# Patient Record
Sex: FEMALE | Race: WHITE | Employment: OTHER
[De-identification: names, ages, dates, MRNs, and addresses within clinical notes are randomized per-mention and may not be internally consistent; named-entity substitution may affect disease eponyms.]

---

## 2017-08-26 ENCOUNTER — HEALTH MAINTENANCE LETTER (OUTPATIENT)
Age: 37
End: 2017-08-26

## 2018-08-27 ENCOUNTER — HEALTH MAINTENANCE LETTER (OUTPATIENT)
Age: 38
End: 2018-08-27

## 2019-05-06 ENCOUNTER — RESULT FOLLOW UP (OUTPATIENT)
Dept: FAMILY MEDICINE | Facility: CLINIC | Age: 39
End: 2019-05-06

## 2019-05-06 ENCOUNTER — OFFICE VISIT (OUTPATIENT)
Dept: FAMILY MEDICINE | Facility: CLINIC | Age: 39
End: 2019-05-06
Payer: COMMERCIAL

## 2019-05-06 VITALS
TEMPERATURE: 99 F | SYSTOLIC BLOOD PRESSURE: 120 MMHG | WEIGHT: 242 LBS | HEIGHT: 67 IN | HEART RATE: 88 BPM | BODY MASS INDEX: 37.98 KG/M2 | OXYGEN SATURATION: 99 % | DIASTOLIC BLOOD PRESSURE: 72 MMHG

## 2019-05-06 DIAGNOSIS — Z12.31 ENCOUNTER FOR SCREENING MAMMOGRAM FOR BREAST CANCER: ICD-10-CM

## 2019-05-06 DIAGNOSIS — F41.9 ANXIETY: ICD-10-CM

## 2019-05-06 DIAGNOSIS — R87.810 CERVICAL HIGH RISK HPV (HUMAN PAPILLOMAVIRUS) TEST POSITIVE: ICD-10-CM

## 2019-05-06 DIAGNOSIS — Z12.4 CERVICAL CANCER SCREENING: ICD-10-CM

## 2019-05-06 DIAGNOSIS — Z80.3 FAMILY HISTORY OF BREAST CANCER IN FIRST DEGREE RELATIVE: ICD-10-CM

## 2019-05-06 DIAGNOSIS — Z97.5 IUD (INTRAUTERINE DEVICE) IN PLACE: ICD-10-CM

## 2019-05-06 DIAGNOSIS — Z00.01 ENCOUNTER FOR ROUTINE ADULT MEDICAL EXAM WITH ABNORMAL FINDINGS: Primary | ICD-10-CM

## 2019-05-06 PROCEDURE — 99385 PREV VISIT NEW AGE 18-39: CPT | Performed by: PHYSICIAN ASSISTANT

## 2019-05-06 PROCEDURE — 87624 HPV HI-RISK TYP POOLED RSLT: CPT | Performed by: PHYSICIAN ASSISTANT

## 2019-05-06 PROCEDURE — G0145 SCR C/V CYTO,THINLAYER,RESCR: HCPCS | Performed by: PHYSICIAN ASSISTANT

## 2019-05-06 RX ORDER — SERTRALINE HYDROCHLORIDE 100 MG/1
100 TABLET, FILM COATED ORAL DAILY
Qty: 90 TABLET | Refills: 1 | Status: SHIPPED | OUTPATIENT
Start: 2019-05-06 | End: 2019-11-26

## 2019-05-06 RX ORDER — SERTRALINE HYDROCHLORIDE 100 MG/1
100 TABLET, FILM COATED ORAL
COMMUNITY
Start: 2018-05-24 | End: 2019-05-06

## 2019-05-06 ASSESSMENT — ANXIETY QUESTIONNAIRES
3. WORRYING TOO MUCH ABOUT DIFFERENT THINGS: NOT AT ALL
6. BECOMING EASILY ANNOYED OR IRRITABLE: NOT AT ALL
1. FEELING NERVOUS, ANXIOUS, OR ON EDGE: SEVERAL DAYS
IF YOU CHECKED OFF ANY PROBLEMS ON THIS QUESTIONNAIRE, HOW DIFFICULT HAVE THESE PROBLEMS MADE IT FOR YOU TO DO YOUR WORK, TAKE CARE OF THINGS AT HOME, OR GET ALONG WITH OTHER PEOPLE: NOT DIFFICULT AT ALL
GAD7 TOTAL SCORE: 2
2. NOT BEING ABLE TO STOP OR CONTROL WORRYING: NOT AT ALL
5. BEING SO RESTLESS THAT IT IS HARD TO SIT STILL: NOT AT ALL
7. FEELING AFRAID AS IF SOMETHING AWFUL MIGHT HAPPEN: SEVERAL DAYS

## 2019-05-06 ASSESSMENT — PATIENT HEALTH QUESTIONNAIRE - PHQ9
5. POOR APPETITE OR OVEREATING: NOT AT ALL
SUM OF ALL RESPONSES TO PHQ QUESTIONS 1-9: 1

## 2019-05-06 ASSESSMENT — MIFFLIN-ST. JEOR: SCORE: 1815.33

## 2019-05-06 NOTE — PROGRESS NOTES
"   SUBJECTIVE:   CC: Jacy Medrano is an 38 year old woman who presents for preventive health visit.       Healthy Habits:    Do you get at least three servings of calcium containing foods daily (dairy, green leafy vegetables, etc.)? yes    Amount of exercise or daily activities, outside of work: 2 day(s) per week    Problems taking medications regularly No    Medication side effects: No    Have you had an eye exam in the past two years? yes    Do you see a dentist twice per year? yes    Do you have sleep apnea, excessive snoring or daytime drowsiness?no    1) Refill zoloft. Worsening anxiety after 2nd child about 2 yrs ago. Admits she had probably some anxiety preceding this and it runs in her family, but after life changes it worsened. Since starting on it has done well and has no side effects with it.      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 Would like to update all family history to ensure she has the appropriate screening.  Maternal and paternal grandmother's both had breast cancer.   Sister was just found to have \"pre-cancerous cells, stage zero\" and elected to just have prophylactic mastectomy as a result of this.   Reports 2 of her sisters had genetic testing and was negative for BRCA. She is not sure what other genetic testing was done or if there were any positive findings. One sister qualifies to have mammograms and breast MRIs done.  Pt would like to have a baseline mammogram as has never had one.   Denies any breast complaints.      Today's PHQ-2 Score:   PHQ-2 ( 1999 Pfizer) 5/6/2019 10/24/2012   Q1: Little interest or pleasure in doing things 0 0   Q2: Feeling down, depressed or hopeless 0 0   PHQ-2 Score 0 0       Abuse: Current or Past(Physical, Sexual or Emotional)- No  Do you feel safe in your environment? Yes    Social History     Tobacco Use     Smoking status: Never Smoker     " "Smokeless tobacco: Never Used   Substance Use Topics     Alcohol use: Yes     Comment: 5/week     If you drink alcohol do you typically have >3 drinks per day or >7 drinks per week? No                     Reviewed orders with patient.  Reviewed health maintenance and updated orders accordingly - Yes  BP Readings from Last 3 Encounters:   05/06/19 120/72   10/24/12 111/72   08/09/12 126/78    Wt Readings from Last 3 Encounters:   05/06/19 109.8 kg (242 lb)   10/24/12 87.5 kg (193 lb)   08/09/12 87.5 kg (193 lb)                  Patient Active Problem List   Diagnosis     Abnormal glandular Papanicolaou smear of cervix     Hyperhidrosis     CARDIOVASCULAR SCREENING; LDL GOAL LESS THAN 160     BMI 30.0-30.9,adult     IUD (intrauterine device) in place - Mirena 3/20/2018, due for replacement 3/20/2023.     Family history of breast cancer in first degree relative - Mother age 51, sister age 40 \"stage zero\" had prophylactic mastectomy, maternal and paternal grandmothers.     Past Surgical History:   Procedure Laterality Date     HC TOOTH EXTRACTION W/FORCEP  1999       Social History     Tobacco Use     Smoking status: Never Smoker     Smokeless tobacco: Never Used   Substance Use Topics     Alcohol use: Yes     Comment: 5/week     Family History   Problem Relation Age of Onset     Breast Cancer Mother 51     Hypertension Father      Hyperlipidemia Father      Breast Cancer Sister         \"stage zero\" had prophylactic mastectomy     Diabetes Maternal Grandmother      Breast Cancer Maternal Grandmother         age 50's     Diabetes Maternal Grandfather         adult onset     Diabetes Paternal Grandmother      Breast Cancer Paternal Grandmother      Breast Cancer Paternal Aunt      C.A.D. No family hx of      Alzheimer Disease No family hx of      Blood Disease No family hx of      Eye Disorder No family hx of      Osteoporosis No family hx of      Thyroid Disease No family hx of      Coronary Artery Disease No family hx " "of      Cerebrovascular Disease No family hx of      Colon Cancer No family hx of          Current Outpatient Medications   Medication Sig Dispense Refill     levonorgestrel (MIRENA) 20 MCG/24HR IUD 1 each (20 mcg) by Intrauterine route once for 1 dose Placed outside [place] 3/20/2018 1 each 0     sertraline (ZOLOFT) 100 MG tablet Take 1 tablet (100 mg) by mouth daily 90 tablet 1       Mammogram not yet required, see FHx above though in HPI.    Pertinent mammograms are reviewed under the imaging tab.  History of abnormal Pap smear: NO - age 30- 65 PAP every 3 years recommended  Reviewed [facility] through [facility] and [facility]where  2005 LSIL  2006 LSIL  2009, 2010, 2011, 2012, 2015 and 2018 NIL    PAP / HPV 4/10/2012 1/10/2011 4/5/2010   PAP NIL NIL NIL       Reviewed and updated as needed this visit by clinical staff  Tobacco  Allergies  Meds  Problems  Med Hx  Surg Hx  Fam Hx  Soc Hx          Reviewed and updated as needed this visit by Provider  Tobacco  Allergies  Meds  Problems  Med Hx  Surg Hx  Fam Hx  Soc Hx             ROS:  CONSTITUTIONAL: NEGATIVE for fever, chills, change in weight  INTEGUMENTARU/SKIN: NEGATIVE for worrisome rashes, moles or lesions  EYES: NEGATIVE for vision changes or irritation  ENT: NEGATIVE for ear, mouth and throat problems  RESP: NEGATIVE for significant cough or SOB  BREAST: NEGATIVE for masses, tenderness or discharge  CV: NEGATIVE for chest pain, palpitations or peripheral edema  GI: NEGATIVE for nausea, abdominal pain, heartburn, or change in bowel habits  : NEGATIVE for unusual urinary or vaginal symptoms. Amenorrheic on Mirena.  MUSCULOSKELETAL: NEGATIVE for significant arthralgias or myalgia  NEURO: NEGATIVE for weakness, dizziness or paresthesias  PSYCHIATRIC: NEGATIVE for changes in mood or affect    OBJECTIVE:   /72 (BP Location: Right arm, Cuff Size: Adult Regular)   Pulse 88   Temp 99  F (37.2  C) (Oral)   Ht 1.702 m (5' 7\")   Wt 109.8 kg (242 lb) " "  SpO2 99%   BMI 37.90 kg/m    EXAM:  GENERAL: healthy, alert and no distress  EYES: Eyes grossly normal to inspection, PERRL and conjunctivae and sclerae normal  HENT: ear canals and TM's normal, nose and mouth without ulcers or lesions  NECK: no adenopathy, no asymmetry, masses, or scars and thyroid normal to palpation  RESP: lungs clear to auscultation - no rales, rhonchi or wheezes  BREAST: normal without masses, tenderness or nipple discharge and no palpable axillary masses or adenopathy  CV: regular rate and rhythm, normal S1 S2, no S3 or S4, no murmur, click or rub, no peripheral edema and peripheral pulses strong  ABDOMEN: soft, nontender, no hepatosplenomegaly, no masses and bowel sounds normal   (female): normal female external genitalia, normal urethral meatus, vaginal mucosa pink, moist, well rugated, and normal cervix/adnexa/uterus without masses or discharge. Mirena strings are noted exiting cervical os.  MS: no gross musculoskeletal defects noted, no edema  SKIN: no suspicious lesions or rashes  NEURO: Normal strength and tone, mentation intact and speech normal  PSYCH: mentation appears normal, affect normal/bright    Diagnostic Test Results:  See flowsheets for PHQ/MAHOGANY scores.    ASSESSMENT/PLAN:       ICD-10-CM    1. Encounter for routine adult medical exam with abnormal findings Z00.01    2. Anxiety F41.9 sertraline (ZOLOFT) 100 MG tablet   3. IUD (intrauterine device) in place - Mirena 3/20/2018, due for replacement 3/20/2023. Z97.5    4. Family history of breast cancer in first degree relative - Mother age 51, sister age 40 \"stage zero\" had prophylactic mastectomy, maternal and paternal grandmothers. Z80.3 CANCER RISK MGMT/CANCER GENETIC COUNSELING REFERRAL     MA Screen Bilateral w/Nicholas   5. Encounter for screening mammogram for breast cancer Z12.31 MA Screen Bilateral w/Nicholas   6. Cervical cancer screening Z12.4 Pap imaged thin layer screen with HPV - recommended age 30 - 65 years (select " "HPV order below)     HPV High Risk Types DNA Cervical   Not fasting - pt would like to defer labs until next year.  Repeat pap today to re-establish in our health system, but Epic review shows she would be able to maintain every 3 yrs screening after history was reviewed.  We also discussed significant FHx of multiple first and second degree relatives with breast cancer. Pt requested to have a baseline mammogram so this was ordered for her today. She is aware that insurance may not cover it and she is ok with this. Will also refer to cancer risk management group at the [facility] to see if any additional testing is needed versus if patient qualifies for any additional high risk screening.   Hx of anxiety - very stable on zoloft. She will continue without changes. Ok to do next visit via phone since very stable in 6 months.     COUNSELING:   Reviewed preventive health counseling, as reflected in patient instructions       Regular exercise       Healthy diet/nutrition       Contraception    BP Readings from Last 1 Encounters:   05/06/19 120/72     Estimated body mass index is 37.9 kg/m  as calculated from the following:    Height as of this encounter: 1.702 m (5' 7\").    Weight as of this encounter: 109.8 kg (242 lb).      Weight management plan: Discussed healthy diet and exercise guidelines     reports that she has never smoked. She has never used smokeless tobacco.      Counseling Resources:  ATP IV Guidelines  Pooled Cohorts Equation Calculator  Breast Cancer Risk Calculator  FRAX Risk Assessment  ICSI Preventive Guidelines  Dietary Guidelines for Americans, 2010  USDA's MyPlate  ASA Prophylaxis  Lung CA Screening    Greta Garcia PA-C  [facility]  "

## 2019-05-07 ASSESSMENT — ANXIETY QUESTIONNAIRES: GAD7 TOTAL SCORE: 2

## 2019-05-08 NOTE — TELEPHONE ENCOUNTER
ONCOLOGY INTAKE: Records Information      APPT INFORMATION:  Referring provider:  Greta Garcia  Referring provider s clinic: [facility]  Reason for visit/diagnosis:  Family History of breast cancer  RECORDS INFORMATION:  Were the records received with the referral (via [employer])? no    Has patient been seen for any external appt for this diagnosis? no    If yes, where? n/a    Has patient had any imaging or procedures outside of [place] for this condition? no      If Yes, where? n/a    ADDITIONAL INFORMATION:  None

## 2019-05-09 LAB
COPATH REPORT: NORMAL
PAP: NORMAL

## 2019-05-13 LAB
FINAL DIAGNOSIS: ABNORMAL
HPV HR 12 DNA CVX QL NAA+PROBE: POSITIVE
HPV16 DNA SPEC QL NAA+PROBE: NEGATIVE
HPV18 DNA SPEC QL NAA+PROBE: NEGATIVE
SPECIMEN DESCRIPTION: ABNORMAL
SPECIMEN SOURCE CVX/VAG CYTO: ABNORMAL

## 2019-05-14 NOTE — PROGRESS NOTES
08/04/05: LSIL Pap  08/15/06: LSIL Pap  02/04/09: NIL Pap  05/04/10: NIL Pap  01/10/11: NIL Pap  04/10/12: NIL Pap  03/20/18: NIL Pap (care everywhere)  05/06/19: NIL Pap, + HR HPV (not 16 or 18) result. Plan cotest in 1 year.   05/14/19:Msg left to call back. Pt was advised.

## 2019-05-15 ENCOUNTER — HOSPITAL ENCOUNTER (OUTPATIENT)
Dept: MAMMOGRAPHY | Facility: CLINIC | Age: 39
Discharge: HOME | End: 2019-05-15
Attending: PHYSICIAN ASSISTANT | Admitting: PHYSICIAN ASSISTANT
Payer: COMMERCIAL

## 2019-05-15 DIAGNOSIS — Z80.3 FAMILY HISTORY OF BREAST CANCER IN FIRST DEGREE RELATIVE: ICD-10-CM

## 2019-05-15 DIAGNOSIS — Z12.31 ENCOUNTER FOR SCREENING MAMMOGRAM FOR BREAST CANCER: ICD-10-CM

## 2019-05-15 PROCEDURE — 77063 BREAST TOMOSYNTHESIS BI: CPT

## 2019-06-26 ENCOUNTER — PRE VISIT (OUTPATIENT)
Dept: ONCOLOGY | Facility: CLINIC | Age: 39
End: 2019-06-26

## 2019-06-26 ENCOUNTER — OFFICE VISIT (OUTPATIENT)
Dept: FAMILY MEDICINE | Facility: CLINIC | Age: 39
End: 2019-06-26
Payer: COMMERCIAL

## 2019-06-26 VITALS — DIASTOLIC BLOOD PRESSURE: 78 MMHG | SYSTOLIC BLOOD PRESSURE: 122 MMHG

## 2019-06-26 DIAGNOSIS — Z12.83 SKIN CANCER SCREENING: Primary | ICD-10-CM

## 2019-06-26 DIAGNOSIS — D22.9 MULTIPLE BENIGN MELANOCYTIC NEVI: ICD-10-CM

## 2019-06-26 DIAGNOSIS — L81.4 SOLAR LENTIGO: ICD-10-CM

## 2019-06-26 DIAGNOSIS — D18.01 CAPILLARY HEMANGIOMA OF SKIN: ICD-10-CM

## 2019-06-26 DIAGNOSIS — D22.72: ICD-10-CM

## 2019-06-26 DIAGNOSIS — Z80.8 FAMILY HISTORY OF SKIN CANCER: ICD-10-CM

## 2019-06-26 PROCEDURE — 99214 OFFICE O/P EST MOD 30 MIN: CPT | Performed by: FAMILY MEDICINE

## 2019-06-26 NOTE — PROGRESS NOTES
[facility] - PRIMARY CARE SKIN    CC: skin cancer screening (full-body)  SUBJECTIVE:   Jacy Zhang is a(n) 38 year old female who presents to clinic today for a full-body skin exam.    No bothersome lesions noticed by the patient or other skin concerns.  A mole on the right cheek has been present since a young age.  A spot on the left thigh may be enlarging.    Jacy requests advice for facial products.    Personal Medical History  Skin cancer: NO  Eczema Psoriasis Autoimmune   NO NO NO     Family Medical History  Skin cancer: YES - in father on forehead (unsure of type)  Eczema Psoriasis Autoimmune   NO NO NO     Sun Exposure History  Previous history of significant sun exposure:  Blistering sunburns: NO.  Tanning beds: NO.  Sunscreen usage: YES, SPF: 30.  UV-protective clothing usage: NO.  Wide-brimmed hat usage: NO.  UV-protective sunglasses usage: YES.  Mid-day sun avoidance: NO.    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    Refer to electronic medical record (EMR) for past medical history and medications.    INTEGUMENTARY/SKIN: NEGATIVE for worrisome rashes, moles or lesions  ROS: 14 point review of systems was negative except the symptoms listed above in the HPI.    This document serves as a record of the services and decisions personally performed and made by Kelly Ochoa MD and was created by Deuce Knight, a trained medical scribe, based on personal observations and provider statements to the medical scribe.  June 26, 2019 2:52 PM   Deuce Knight    OBJECTIVE:   GENERAL: healthy, alert and no distress.  SKIN: Mcmanus Skin Type - II.  This patient was examined from the top of the head to the bottom of the feet  including scalp, face, neck, trunk, buttocks, both arms, both legs, both hands, both feet, and all fingers and toes. The dermatoscope was used to help evaluate pigmented lesions.  Skin Pertinent Findings:  Face: Scattered brown, macule(s) most consistent with benign solar  "lentigo.    Upper extremities: Scattered brown macules of various sizes and shapes most consistent with (benign) melanocytic nevi.    Left lateral hip: 10 mm in size stuck-on appearing papules, raised, brown, coarse-textured, round lesion(s) most consistent with seborrheic keratoses.    Left lateral patella: 25 mm in length x 10 mm nevus spilus.    Left distal anterior thigh: slightly raised, red lesion(s) consistent with capillary hemangioma.    Back: scattered infrequent brown macules of various sizes and shapes most consistent with (benign) melanocytic nevi.    ASSESSMENT:     Encounter Diagnoses   Name Primary?     Skin cancer screening Yes     Family history of skin cancer      Solar lentigo      Multiple benign melanocytic nevi      Nevus spilus of left lower leg      Capillary hemangioma of skin        PLAN:   Patient Instructions   FUTURE APPOINTMENTS    Follow up in 2-3 years for a full-body skin cancer screening.    Consider scheduling to see Diogenes Patrick, clinic , for consultation of skin products.    Make sure to use a facial cleanser, facial moisturizer and sunscreen regularly. You may also consider use of a topical retinol and a Vitamin C serum.    SUN PROTECTION INSTRUCTIONS  Sun damage can lead to skin cancer and premature aging of the skin.      The best way to protect from sun damage to your skin is to avoid the sun during peak hours (10 am - 2 pm) even on overcast days.    Never use tanning beds. Tanning beds are associated with much higher risks of skin cancer.    All tanning damages the skin. Aim for ivory skin year round and you will have less trouble with your skin in years to come. There is no merit in getting \"a base tan\" before a warm weather vacation, as any tanning indicates your body's response to sun damage.    Stop smoking. Smokers have higher rates of skin cancer and also have premature skin wrinkling.    Use UPF sun-protective clothing, which while more expensive " "initially provides longer lasting coverage without having to worry about remembering to re-apply.  1. Wear a wide-brimmed hat and sunglasses.   2. Wear sun-protective clothing.  [employer] and other [employer] make sun protective clothing that are stylish, comfortable and cool.   [employer] and other [employer] make UV arm sleeves suitable for golfing, gardening and other activities.    Sunscreen instructions:  1. Use sunscreens with Zinc Oxide, Titanium Dioxide or Avobenzone to protect from UVA rays.  2. Use SPF 30-50+ to protect from UVB rays.  3. Re-apply every 2 hours even if water resistant.  4. Apply on your face every day even when cloudy and even in the winter. UVA \"aging rays\" penetrate window glass and are just as strong in the winter as in the summer.    FYI  You should use about 3 tablespoons of sunscreen to protect your whole body. Thus a typical eight ounce bottle of sunscreen should last 4 applications. Remember, that the SPF rating is compromised if you don t apply enough. Most people only apply 1/2 - 1/3 of the amount they need. Also don t forget areas such as your ears, feet, upper back and harder to reach places. Keep in mind that these amounts should be increased for larger body sizes.    Sunscreens with titanium dioxide and/or zinc oxide in the active ingredients are physical blockers as opposed to chemical blockers. Chemical-free sunscreens should not irritate the skin.    Spray-on sunscreens may be used for touch-up application only, not as a base layer. Also, use with caution around small children due to inhalation risk.    SPF means sun protection factor, which is just the degree to which the sunscreen can protect against UVB rays. There is no rating system for UVA rays. SPF is calculated as the time skin will burn when sunscreen is applied vs. skin without sunscreen.    Water resistant sunscreens should be re-applied every 1-2 hours.    Product Recommendations:    Consider use of " sunscreen sticks with Zinc Oxide and Titanium Dioxide active ingredients such as Neutrogena Pure&Free Baby Sunscreen Stick.    Good examples include: Blue Lizard, EltaMD, Solbar    Good daily moisturizers with SPF: Vanicream, CeraVe.    For sensitive skin, consider : SkinMedica Essential Defense Mineral Shield Broad Spectrum SPF 35    Men: consider use of Neutrogena Triple Protect Facial Lotion    Avoid retinyl palmitate products.  Avoid combination products that include both sunscreen and insect repellant, as sunscreen should be applied every 2 hours, but insect repellant should not be applied as frequently.    For more information:  https://www.skincancer.org/prevention/sun-protection/sunscreen/sunscreens-safe-and-effective      The patient was counseled about sunscreens and sun avoidance. The patient was counseled to check the skin regularly and report any lesion that is new, changing, itching, scabbing, bleeding or otherwise bothersome. The patient was discharged ambulatory and in stable condition.    TT: 25 minutes.  CT: 15 minutes.    The information in this document, created by the medical scribe for me, accurately reflects the services I personally performed and the decisions made by me. I have reviewed and approved this document for accuracy prior to leaving the patient care area.  June 26, 2019 2:52 PM  Kelly Ochoa MD  [facility] [place]

## 2019-06-26 NOTE — PATIENT INSTRUCTIONS
"FUTURE APPOINTMENTS    Follow up in 2-3 years for a full-body skin cancer screening.    Consider scheduling to see Diogenes Patrick, clinic , for consultation of skin products.    Make sure to use a facial cleanser, facial moisturizer and sunscreen regularly. You may also consider use of a topical retinol and a Vitamin C serum.    SUN PROTECTION INSTRUCTIONS  Sun damage can lead to skin cancer and premature aging of the skin.      The best way to protect from sun damage to your skin is to avoid the sun during peak hours (10 am - 2 pm) even on overcast days.    Never use tanning beds. Tanning beds are associated with much higher risks of skin cancer.    All tanning damages the skin. Aim for ivory skin year round and you will have less trouble with your skin in years to come. There is no merit in getting \"a base tan\" before a warm weather vacation, as any tanning indicates your body's response to sun damage.    Stop smoking. Smokers have higher rates of skin cancer and also have premature skin wrinkling.    Use UPF sun-protective clothing, which while more expensive initially provides longer lasting coverage without having to worry about remembering to re-apply.  1. Wear a wide-brimmed hat and sunglasses.   2. Wear sun-protective clothing.  [employer] and other [employer] make sun protective clothing that are stylish, comfortable and cool.   [employer] and other [employer] make UV arm sleeves suitable for golfing, gardening and other activities.    Sunscreen instructions:  1. Use sunscreens with Zinc Oxide, Titanium Dioxide or Avobenzone to protect from UVA rays.  2. Use SPF 30-50+ to protect from UVB rays.  3. Re-apply every 2 hours even if water resistant.  4. Apply on your face every day even when cloudy and even in the winter. UVA \"aging rays\" penetrate window glass and are just as strong in the winter as in the summer.    FYI  You should use about 3 tablespoons of sunscreen to protect your " whole body. Thus a typical eight ounce bottle of sunscreen should last 4 applications. Remember, that the SPF rating is compromised if you don t apply enough. Most people only apply 1/2 - 1/3 of the amount they need. Also don t forget areas such as your ears, feet, upper back and harder to reach places. Keep in mind that these amounts should be increased for larger body sizes.    Sunscreens with titanium dioxide and/or zinc oxide in the active ingredients are physical blockers as opposed to chemical blockers. Chemical-free sunscreens should not irritate the skin.    Spray-on sunscreens may be used for touch-up application only, not as a base layer. Also, use with caution around small children due to inhalation risk.    SPF means sun protection factor, which is just the degree to which the sunscreen can protect against UVB rays. There is no rating system for UVA rays. SPF is calculated as the time skin will burn when sunscreen is applied vs. skin without sunscreen.    Water resistant sunscreens should be re-applied every 1-2 hours.    Product Recommendations:    Consider use of sunscreen sticks with Zinc Oxide and Titanium Dioxide active ingredients such as Neutrogena Pure&Free Baby Sunscreen Stick.    Good examples include: Blue Lizard, EltaMD, Solbar    Good daily moisturizers with SPF: Vanicream, CeraVe.    For sensitive skin, consider : SkinMedica Essential Defense Mineral Shield Broad Spectrum SPF 35    Men: consider use of Neutrogena Triple Protect Facial Lotion    Avoid retinyl palmitate products.  Avoid combination products that include both sunscreen and insect repellant, as sunscreen should be applied every 2 hours, but insect repellant should not be applied as frequently.    For more information:  https://www.skincancer.org/prevention/sun-protection/sunscreen/sunscreens-safe-and-effective

## 2019-08-25 NOTE — PROGRESS NOTES
8/26/2019    Referring Provider: Greta Garcia PA-C    Presenting Information:   I met with Jacy Zhang today for genetic counseling at the Cancer Risk Management Program at the [facility] to discuss her family history of breast, colon, kidney, bladder, and prostate cancer. She is here today to review this history, cancer screening recommendations, and available genetic testing options.    Personal History:  Jacy is a 38 year old female. She does not have any personal history of cancer.      She had her first menstrual period at age 13, her first child at age 34, and is premenopausal.  Jacy has her ovaries, fallopian tubes and uterus in place, and she has not had ovarian cancer screening to date. She reports that she has not had hormone replacement therapy.      She has had clinical breast exams and mammograms based on her family history; her most recent mammogram in 5- was normal. Jacy has not had a colonoscopy. She does not regularly do any other cancer screening at this time. Jacy reported no tobacco use and social alcohol use.    Family History: (Please see scanned pedigree for detailed family history information)    Jacy's sister was diagnosed with DCIS at 40 and is currently 41. By report, she had negative BRCA1/2 testing. Jacy was not sure if there were other genes included in her sister's genetic testing and plans to follow-up with me soon regarding this test.    Her two older sisters do not have a history of cancer, but underwent genetic testing and were reportedly negative.     Her mother had breast cancer at 52, 57, and 62; she is currently 67. By report, she had negative genetic testing through [facility] for 42 genes.     Her maternal aunt had breast cancer at 60 and is currently 61.     Her maternal grandmother had breast cancer at 82 and passed away at 90.     Her maternal grandfather had bladder cancer at 63 and kidney cancer at 76. He passed away at 76.  His sister had colon cancer at 76 and passed at 76.    By report, Jacy's father had negative genetic testing in 2015.     Her paternal grandmother had breast cancer at 52 and passed soon after. Her sister (Jacy's great aunt) had breast cancer at an unknown age. This great aunt had a daughter who also had breast cancer at an unknown age.    Her paternal grandfather had prostate cancer at 81 and passed at 83. He had three sisters with breast cancer with age of onset unknown.    Her maternal ethnicity is [place]. Her paternal ethnicity is [place] and [place].  There is no known Ashkenazi Pentecostal ancestry on either side of her family. There is no reported consanguinity.    Discussion:    Jacy's family history of breast cancer is suggestive of a hereditary cancer syndrome.     We reviewed the features of sporadic, familial, and hereditary cancers. In looking at Jacy's family history, it is possible that a cancer susceptibility gene is present due to the presence of breast cancer on both the maternal and paternal sides of the family. We discussed that her maternal grandfather's bladder and kidney cancer, his brother's colon cancer, and her paternal grandfather's prostate cancer are less likely to be caused by a hereditary cancer syndrome given the older age of onset.   We discussed the natural history and genetics of Hereditary Breast and Ovarian Cancer (HBOC), which is caused by a mutation in the BRCA1 or BRCA2 gene.  HBOC typically presents with multiple family members diagnosed with breast cancer before age 50 and/or ovarian cancer. Other cancer risks associated with HBOC include male breast cancer, prostate cancer, pancreatic cancer, and melanoma.     We discussed that genetic testing for breast cancer susceptibility genes is typically most informative when it is first performed on a family member with a personal history of  breast cancer. In this situation, we discussed that Jacy's mother, father, and sister  with DCIS all had negative genetic testing for BRCA1 and BRCA2. We reviewed the autosomal dominant inheritance of mutations in BRCA1 and BRCA2 and discussed that her parents did not pass on an identifiable mutation in these genes to their children based on this test result. Mutations in these genes do not skip generations.      We discussed that if her sister only had testing for BRCA1 and BRCA2 only, there are additional genes that could cause increased risk for breast cancer. As many of these genes present with overlapping features in a family and accurate cancer risk cannot always be established based upon the pedigree analysis alone, it would be reasonable for Jacy to consider panel genetic testing in the future to analyze multiple genes at once.    Testing is available to Jacy, but with limitations. If Jacy pursues testing at this time and receives a negative result, this does not rule out the possibility of a hereditary cancer syndrome in her and/or her family. Jacy opted not to have genetic testing today, but expressed that she will consider it for the future.    Based on her personal and family history, Jacy has a 38.3% lifetime risk of developing breast cancer based on the ENEDINA model. As such, Jacy meets current National Comprehensive Cancer Network (NCCN) guidelines for high risk breast screening. This includes annual breast MRI in addition to annual mammogram beginning at age 30.  In addition, Jacy should be receiving clinical breast exams by her physician. We discussed that Jacy could participate in our Cancer Risk Management Program in which our clinical nurse specialist provides an individual screening plan and assists with medical management. A referral was made to see ISHA Stafford, for this service.    Medical Management: For Jacy, we reviewed that the information from future genetic testing may determine:    additional cancer screening for which Jacy may qualify (i.e.  mammogram and breast MRI, more frequent colonoscopies, more frequent dermatologic exams, etc.),    options for risk reducing surgeries Jacy could consider (i.e. bilateral mastectomy, surgery to remove her ovaries and/or uterus, etc.),      These recommendations will be discussed in detail if she decides to undergo genetic testing in the future.     Plan:  1) Today Jacy elected not to proceed with genetic testing. She will contact me if she decides to test in the future.  2) She plans to contact me with information about her sister's genetic testing.  3) A referral was placed to see our clinical nurse specialist, ISHA Stafford, CNS.    Face to face time: 40 minutes    Kerry Lynn MS  Genetic counselor  [phone]      Attestation: Patient seen, evaluated and discussed with the Genetic Counseling Intern. I have verified the content of the note, which accurately reflects my assessment of the patient and the plan of care.    Supervising Genetic Counselor  Re Kerr MS, [facility]  Licensed Genetic Counselor  [phone]

## 2019-08-26 ENCOUNTER — ONCOLOGY VISIT (OUTPATIENT)
Dept: ONCOLOGY | Facility: CLINIC | Age: 39
End: 2019-08-26
Attending: GENETIC COUNSELOR, MS
Payer: COMMERCIAL

## 2019-08-26 ENCOUNTER — PRE VISIT (OUTPATIENT)
Dept: ONCOLOGY | Facility: CLINIC | Age: 39
End: 2019-08-26

## 2019-08-26 DIAGNOSIS — Z80.3 FAMILY HISTORY OF BREAST CANCER IN FIRST DEGREE RELATIVE: Primary | ICD-10-CM

## 2019-08-26 PROCEDURE — 96040 ZZH GENETIC COUNSELING, EACH 30 MINUTES: CPT | Performed by: GENETIC COUNSELOR, MS

## 2019-08-26 NOTE — LETTER
8/26/2019         RE: Jacy Zhang  [address] [place] [address]        Dear Colleague,    Thank you for referring your patient, Jacy Zhang, to the CANCER RISK MANAGEMENT PROGRAM. Please see a copy of my visit note below.    8/26/2019    Referring Provider: Greta Garcia PA-C    Presenting Information:   I met with Jacy Zhang today for genetic counseling at the Cancer Risk Management Program at the [facility] to discuss her family history of breast, colon, kidney, bladder, and prostate cancer. She is here today to review this history, cancer screening recommendations, and available genetic testing options.    Personal History:  Jacy is a 38 year old female. She does not have any personal history of cancer.      She had her first menstrual period at age 13, her first child at age 34, and is premenopausal.  Jacy has her ovaries, fallopian tubes and uterus in place, and she has not had ovarian cancer screening to date. She reports that she has not had hormone replacement therapy.      She has had clinical breast exams and mammograms based on her family history; her most recent mammogram in 5- was normal. Jacy has not had a colonoscopy. She does not regularly do any other cancer screening at this time. Jacy reported no tobacco use and social alcohol use.    Family History: (Please see scanned pedigree for detailed family history information)    Jacy's sister was diagnosed with DCIS at 40 and is currently 41. By report, she had negative BRCA1/2 testing. Jacy was not sure if there were other genes included in her sister's genetic testing and plans to follow-up with me soon regarding this test.    Her two older sisters do not have a history of cancer, but underwent genetic testing and were reportedly negative.     Her mother had breast cancer at 52, 57, and 62; she is currently 67. By report, she had negative genetic testing through [facility] for 42 genes.      Her maternal aunt had breast cancer at 60 and is currently 61.     Her maternal grandmother had breast cancer at 82 and passed away at 90.     Her maternal grandfather had bladder cancer at 63 and kidney cancer at 76. He passed away at 76. His sister had colon cancer at 76 and passed at 76.    By report, Jacy's father had negative genetic testing in 2015.     Her paternal grandmother had breast cancer at 52 and passed soon after. Her sister (Jacy's great aunt) had breast cancer at an unknown age. This great aunt had a daughter who also had breast cancer at an unknown age.    Her paternal grandfather had prostate cancer at 81 and passed at 83. He had three sisters with breast cancer with age of onset unknown.    Her maternal ethnicity is [place]i. Her paternal ethnicity is [place] and [place].  There is no known Ashkenazi Bahai ancestry on either side of her family. There is no reported consanguinity.    Discussion:    Jacy's family history of breast cancer is suggestive of a hereditary cancer syndrome.     We reviewed the features of sporadic, familial, and hereditary cancers. In looking at Jacy's family history, it is possible that a cancer susceptibility gene is present due to the presence of breast cancer on both the maternal and paternal sides of the family. We discussed that her maternal grandfather's bladder and kidney cancer, his brother's colon cancer, and her paternal grandfather's prostate cancer are less likely to be caused by a hereditary cancer syndrome given the older age of onset.   We discussed the natural history and genetics of Hereditary Breast and Ovarian Cancer (HBOC), which is caused by a mutation in the BRCA1 or BRCA2 gene.  HBOC typically presents with multiple family members diagnosed with breast cancer before age 50 and/or ovarian cancer. Other cancer risks associated with HBOC include male breast cancer, prostate cancer, pancreatic cancer, and melanoma.     We discussed that  genetic testing for breast cancer susceptibility genes is typically most informative when it is first performed on a family member with a personal history of  breast cancer. In this situation, we discussed that Jacy's mother, father, and sister with DCIS all had negative genetic testing for BRCA1 and BRCA2. We reviewed the autosomal dominant inheritance of mutations in BRCA1 and BRCA2 and discussed that her parents did not pass on an identifiable mutation in these genes to their children based on this test result. Mutations in these genes do not skip generations.      We discussed that if her sister only had testing for BRCA1 and BRCA2 only, there are additional genes that could cause increased risk for breast cancer. As many of these genes present with overlapping features in a family and accurate cancer risk cannot always be established based upon the pedigree analysis alone, it would be reasonable for Jacy to consider panel genetic testing in the future to analyze multiple genes at once.    Testing is available to Jacy, but with limitations. If Jacy pursues testing at this time and receives a negative result, this does not rule out the possibility of a hereditary cancer syndrome in her and/or her family. Jacy opted not to have genetic testing today, but expressed that she will consider it for the future.    Based on her personal and family history, Jacy has a 38.3% lifetime risk of developing breast cancer based on the ENEDINA model. As such, Jacy meets current National Comprehensive Cancer Network (NCCN) guidelines for high risk breast screening. This includes annual breast MRI in addition to annual mammogram beginning at age 30.  In addition, Jacy should be receiving clinical breast exams by her physician. We discussed that Jacy could participate in our Cancer Risk Management Program in which our clinical nurse specialist provides an individual screening plan and assists with medical management.  A referral was made to see ISHA Stafford, for this service.    Medical Management: For Jacy, we reviewed that the information from future genetic testing may determine:    additional cancer screening for which Jacy may qualify (i.e. mammogram and breast MRI, more frequent colonoscopies, more frequent dermatologic exams, etc.),    options for risk reducing surgeries Jacy could consider (i.e. bilateral mastectomy, surgery to remove her ovaries and/or uterus, etc.),      These recommendations will be discussed in detail if she decides to undergo genetic testing in the future.     Plan:  1) Today Jacy elected not to proceed with genetic testing. She will contact me if she decides to test in the future.  2) She plans to contact me with information about her sister's genetic testing.  3) A referral was placed to see our clinical nurse specialist, ISHA Stafford, CNS.    Face to face time: 40 minutes    Kerry Lynn MS  Genetic counselor  [phone]      Attestation: Patient seen, evaluated and discussed with the Genetic Counseling Intern. I have verified the content of the note, which accurately reflects my assessment of the patient and the plan of care.    Supervising Genetic Counselor  Re Kerr MS, [facility]  Licensed Genetic Counselor  [phone]                     Again, thank you for allowing me to participate in the care of your patient.        Sincerely,        Re Kerr, YAKOV

## 2019-08-26 NOTE — LETTER
Cancer Risk Management  Program Locations    [facility] Cancer [facility]  Mailing Address  Cancer Risk Management Program  [facility]  [address]  [place] [address]    New patient appointments  [phone]  August 28, 2019    Jacy Zhang  [address] [place] [address]      Dear Jacy,    It was a pleasure meeting with you at the [facility] on 8/26/2019. Here is a copy of the progress note from your recent genetic counseling visit to the Cancer Risk Management Program.  If you have any additional questions, please feel free to call.    Referring Provider: Greta Garcia PA-C    Presenting Information:   I met with Jacy Zhang today for genetic counseling at the Cancer Risk Management Program at the [facility] to discuss her family history of breast, colon, kidney, bladder, and prostate cancer. She is here today to review this history, cancer screening recommendations, and available genetic testing options.    Personal History:  Jacy is a 38 year old female. She does not have any personal history of cancer.      She had her first menstrual period at age 13, her first child at age 34, and is premenopausal.  Jacy has her ovaries, fallopian tubes and uterus in place, and she has not had ovarian cancer screening to date. She reports that she has not had hormone replacement therapy.      She has had clinical breast exams and mammograms based on her family history; her most recent mammogram in 5- was normal. Jacy has not had a colonoscopy. She does not regularly do any other cancer screening at this time. Jacy reported no tobacco use and social alcohol use.    Family History: (Please see scanned pedigree for detailed family history information)    Jacy's  sister was diagnosed with DCIS at 40 and is currently 41. By report, she had negative BRCA1/2 testing. Jacy was not sure if there were other genes included in her sister's genetic testing and plans to follow-up with me soon regarding this test.    Her two older sisters do not have a history of cancer, but underwent genetic testing and were reportedly negative.     Her mother had breast cancer at 52, 57, and 62; she is currently 67. By report, she had negative genetic testing through [facility] for 42 genes.     Her maternal aunt had breast cancer at 60 and is currently 61.     Her maternal grandmother had breast cancer at 82 and passed away at 90.     Her maternal grandfather had bladder cancer at 63 and kidney cancer at 76. He passed away at 76. His sister had colon cancer at 76 and passed at 76.    By report, Jacy's father had negative genetic testing in 2015.     Her paternal grandmother had breast cancer at 52 and passed soon after. Her sister (Jacy's great aunt) had breast cancer at an unknown age. This great aunt had a daughter who also had breast cancer at an unknown age.    Her paternal grandfather had prostate cancer at 81 and passed at 83. He had three sisters with breast cancer with age of onset unknown.    Her maternal ethnicity is [place]. Her paternal ethnicity is [place] and Citizen of [place].  There is no known Ashkenazi Moravian ancestry on either side of her family. There is no reported consanguinity.    Discussion:    Jacy's family history of breast cancer is suggestive of a hereditary cancer syndrome.     We reviewed the features of sporadic, familial, and hereditary cancers. In looking at Jacy's family history, it is possible that a cancer susceptibility gene is present due to the presence of breast cancer on both the maternal and paternal sides of the family. We discussed that her maternal grandfather's bladder and kidney cancer, his brother's colon cancer, and her paternal grandfather's prostate  cancer are less likely to be caused by a hereditary cancer syndrome given the older age of onset.   We discussed the natural history and genetics of Hereditary Breast and Ovarian Cancer (HBOC), which is caused by a mutation in the BRCA1 or BRCA2 gene.  HBOC typically presents with multiple family members diagnosed with breast cancer before age 50 and/or ovarian cancer. Other cancer risks associated with HBOC include male breast cancer, prostate cancer, pancreatic cancer, and melanoma.     We discussed that genetic testing for breast cancer susceptibility genes is typically most informative when it is first performed on a family member with a personal history of  breast cancer. In this situation, we discussed that Jacy's mother, father, and sister with DCIS all had negative genetic testing for BRCA1 and BRCA2. We reviewed the autosomal dominant inheritance of mutations in BRCA1 and BRCA2 and discussed that her parents did not pass on an identifiable mutation in these genes to their children based on this test result. Mutations in these genes do not skip generations.      We discussed that if her sister only had testing for BRCA1 and BRCA2 only, there are additional genes that could cause increased risk for breast cancer. As many of these genes present with overlapping features in a family and accurate cancer risk cannot always be established based upon the pedigree analysis alone, it would be reasonable for Jacy to consider panel genetic testing in the future to analyze multiple genes at once.    Testing is available to Jacy, but with limitations. If Jacy pursues testing at this time and receives a negative result, this does not rule out the possibility of a hereditary cancer syndrome in her and/or her family. Jacy opted not to have genetic testing today, but expressed that she will consider it for the future.    Based on her personal and family history, Jacy has a 38.3% lifetime risk of developing breast  cancer based on the ENEDINA model. As such, Jacy meets current National Comprehensive Cancer Network (NCCN) guidelines for high risk breast screening. This includes annual breast MRI in addition to annual mammogram beginning at age 30.  In addition, Jacy should be receiving clinical breast exams by her physician. We discussed that Jacy could participate in our Cancer Risk Management Program in which our clinical nurse specialist provides an individual screening plan and assists with medical management. A referral was made to see ISHA Stafford, for this service.    Medical Management: For Jacy, we reviewed that the information from future genetic testing may determine:    additional cancer screening for which Jacy may qualify (i.e. mammogram and breast MRI, more frequent colonoscopies, more frequent dermatologic exams, etc.),    options for risk reducing surgeries Jacy could consider (i.e. bilateral mastectomy, surgery to remove her ovaries and/or uterus, etc.),      These recommendations will be discussed in detail if she decides to undergo genetic testing in the future.     Plan:  1) Today Jacy elected not to proceed with genetic testing. She will contact me if she decides to test in the future.  2) She plans to contact me with information about her sister's genetic testing.  3) A referral was placed to see our clinical nurse specialist, ISHA Stafford, CNS.    Kerry Lynn MS  Genetic counselor  [phone]

## 2019-08-26 NOTE — PATIENT INSTRUCTIONS
Assessing Cancer Risk  Only about 5-10% of cancers are thought to be due to an inherited cancer susceptibility gene.    These families often have:    Several people with the same or related types of cancer    Cancers diagnosed at a young age (before age 50)    Individuals with more than one primary cancer    Multiple generations of the family affected with cancer    Some people may be candidates for genetic testing of more than one gene.  For these families, genetic testing using a cancer panel may be offered.  These panels will test different genes known to increase the risk for breast, ovarian, uterine, and/or other cancers. All of the genes discussed below have published clinical management guidelines for individuals who are found to carry a mutation. The purpose of this handout is to serve as a brief summary of the genes analyzed by the panels used to inquire about hereditary breast and gynecologic cancer:  PABLO, BRCA1, BRCA2, BRIP1, CDH1, CHEK2, MLH1, MSH2, MSH6, PMS2, EPCAM, PTEN, PALB2, RAD51C, RAD51D, and TP53.  ______________________________________________________________________________  Hereditary Breast and Ovarian Cancer Syndrome   (BRCA1 and BRCA2)  A single mutation in one of the copies of BRCA1 or BRCA2 increases the risk for breast and ovarian cancer, among others.  The risk for pancreatic cancer and melanoma may also be slightly increased in some families.  The chart below shows the chance that someone with a BRCA mutation would develop cancer in his or her lifetime1,2,3,4.        A person s ethnic background is also important to consider, as individuals of Ashkenazi Congregation ancestry have a higher chance of having a BRCA gene mutation.  There are three BRCA mutations that occur more frequently in this population.    Fabian Syndrome   (MLH1, MSH2, MSH6, PMS2, and EPCAM)  Currently five genes are known to cause Fabian Syndrome: MLH1, MSH2, MSH6, PMS2, and EPCAM.  A single mutation in one of the  Fabian Syndrome genes increases the risk for colon, endometrial, ovarian, and stomach cancers.  Other cancers that occur less commonly in Fabian Syndrome include urinary tract, skin, and brain cancers.  The chart below shows the chance that a person with Fabian syndrome would develop cancer in his or her lifetime5.      *Cancer risk varies depending on Fabian syndrome gene found    Cowden Syndrome   (PTEN)  Cowden syndrome is a hereditary condition that increases the risk for breast, thyroid, endometrial, colon, and kidney cancer.  Cowden syndrome is caused by a mutation in the PTEN gene.  A single mutation in one of the copies of PTEN causes Cowden syndrome and increases cancer risk.  The chart below shows the chance that someone with a PTEN mutation would develop cancer in their lifetime6,7.  Other benign features seen in some individuals with Cowden syndrome include benign skin lesions (facial papules, keratoses, lipomas), learning disability, autism, thyroid nodules, colon polyps, and larger head size.      *One recent study found breast cancer risk to be increased to 85%    Li-Fraumeni Syndrome   (TP53)  Li-Fraumeni Syndrome (LFS) is a cancer predisposition syndrome caused by a mutation in the TP53 gene. A single mutation in one of the copies of TP53 increases the risk for multiple cancers. Individuals with LFS are at an increased risk for developing cancer at a young age. The lifetime risk for development of a LFS-associated cancer is 50% by age 30 and 90% by age 60.   Core Cancers: Sarcomas, Breast, Brain, Lung, Leukemias/Lymphomas, Adrenocortical carcinomas  Other Cancers: Gastrointestinal, Thyroid, Skin, Genitourinary    Hereditary Diffuse Gastric Cancer   (CDH1)  Currently, one gene is known to cause hereditary diffuse gastric cancer (HDGC): CDH1.  Individuals with HDGC are at increased risk for diffuse gastric cancer and lobular breast cancer. Of people diagnosed with HDGC, 30-50% have a mutation in the CDH1  gene.  This suggests there are likely other genes that may cause HDGC that have not been identified yet.      Lifetime Cancer Risks    General Population HDGC    Diffuse Gastric  <1% ~80%   Breast 12% 39-52%         Additional Genes  PABLO  PABLO is a moderate-risk breast cancer gene. Women who have a mutation in PABLO can have between a 2-4 fold increased risk for breast cancer compared to the general population8. PABLO mutations have also been associated with increased risk for pancreatic cancer, however an estimate of this cancer risk is not well understood9. Individuals who inherit two PABLO mutations have a condition called ataxia-telangiectasia (AT).  This rare autosomal recessive condition affects the nervous system and immune system, and is associated with progressive cerebellar ataxia beginning in childhood.  Individuals with ataxia-telangiectasia often have a weakened immune system and have an increased risk for childhood cancers.    PALB2  Mutations in PALB2 have been shown to increase the risk of breast cancer up to 33-58% in some families; where individuals fall within this risk range is dependent upon family [id number]. PALB2 mutations have also been associated with increased risk for pancreatic cancer, although this risk has not been quantified yet.  Individuals who inherit two PALB2 mutations--one from their mother and one from their father--have a condition called Fanconi Anemia.  This rare autosomal recessive condition is associated with short stature, developmental delay, bone marrow failure, and increased risk for childhood cancers.    CHEK2   CHEK2 is a moderate-risk breast cancer gene.  Women who have a mutation in CHEK2 have around a 2-fold increased risk for breast cancer compared to the general population, and this risk may be higher depending upon family history.11,12,13 Mutations in CHEK2 have also been shown to increase the risk of a number of other cancers, including colon and prostate, however  these cancer risks are currently not well understood.    BRIP1, RAD51C and RAD51D  Mutations in BRIP1, RAD51C, and RAD51D have been shown to increase the risk of ovarian cancer and possibly female breast cancer as well14,15 .       Lifetime Cancer Risk    General Population BRIP1 RAD51C RAD51D   Ovarian 1-2% ~5-8% ~5-9% ~7-15%           Inheritance  All of the cancer syndromes reviewed above are inherited in an autosomal dominant pattern.  This means that if a parent has a mutation, each of his or her children will have a 50% chance of inheriting that same mutation.  Therefore, each child--male or female--would have a 50% chance of being at increased risk for developing cancer.      Image obtained from Genetics Home Reference, 2013     Mutations in some genes can occur de magan, which means that a person s mutation occurred for the first time in them and was not inherited from a parent.  Now that they have the mutation, however, it can be passed on to future generations.    Genetic Testing  Genetic testing involves a blood test and will look at the genetic information in the PABLO, BRCA1, BRCA2, BRIP1, CDH1, CHEK2, MLH1, MSH2, MSH6, PMS2, EPCAM, PTEN, PALB2, RAD51C, RAD51D, and TP53 genes for any harmful mutations that are associated with increased cancer risk.  If possible, it is recommended that the person(s) who has had cancer be tested before other family members.  That person will give us the most useful information about whether or not a specific gene is associated with the cancer in the family.    Results  There are three possible results of genetic testing:    Positive--a harmful mutation was identified in one or more of the genes    Negative--no mutation was identified in any of the genes on this panel    Variant of unknown significance--a variation in one of the genes was identified, but it is unclear how this impacts cancer risk in the family    Advantages and Disadvantages   There are advantages and  disadvantages to genetic testing.    Advantages    May clarify your cancer risk    Can help you make medical decisions    May explain the cancers in your family    May give useful information to your family members (if you share your results)    Disadvantages    Possible negative emotional impact of learning about inherited cancer risk    Uncertainty in interpreting a negative test result in some situations    Possible genetic discrimination concerns (see below)    Genetic Information Nondiscrimination Act (KARLA)  KARLA is a federal law that protects individuals from health insurance or employment discrimination based on a genetic test result alone.  Although rare, there are currently no legal discrimination protections in terms of life insurance, long term care, or disability insurances.  Visit the [employer] website to learn more.    Reducing Cancer Risk  All of the genes described above have nationally recognized cancer screening guidelines that would be recommended for individuals who test positive.  In addition to increased cancer screening, surgeries may be offered or recommended to reduce cancer risk.  Recommendations are based upon an individual s genetic test result as well as their personal and family history of cancer.    Questions to Think About Regarding Genetic Testing:    What effect will the test result have on me and my relationship with my family members if I have an inherited gene mutation?  If I don t have a gene mutation?    Should I share my test results, and how will my family react to this news, which may also affect them?    Are my children ready to learn new information that may one day affect their own health?    Hereditary Cancer Resources    FORCE: Facing Our Risk of Cancer Empowered facingourrisk.org   Bright Pink bebrightpink.org   Li-Fraumeni Syndrome Association lfsassociation.org   PTEN World PTENworld.com   No stomach for cancer, Inc.  nostomachforcancer.org   Stomach cancer relief network Scrnet.org   Collaborative Group of the Americas on Inherited Colorectal Cancer (CGA) cgaicc.com    Cancer Care cancercare.org   American Cancer Society (ACS) cancer.org   National Cancer Everetts (NCI) cancer.gov     Please call us if you have any questions or concerns.   Cancer Risk Management Program [phone] ([phone])  ? Aditi Peguero, MS, [facility] [phone]  ? Aryan Jones, MS  [phone]  ? Anabel Ibrahim, MS, [facility] [phone]  ? Re Kenjohn, MS, [facility] [phone]  ? Tamelamor Lynn, MS, [facility] [phone]  ? Melissa Marcos, MS, [facility] [phone]  ? Giovanna Janette, MS, [facility] [phone]  ? Kerry Lynn, MS  [phone]    References  1. [place] QUILES, Taran PDP, Epi S, Risch SO, Anna Marie JE, Devin JL, Alek N, Basia H, Lynne O, Ulisses A, Missaelini B, Radimaxine P, Manlenardkielfego S, Rachel DM, Rambo N, Juan Carlos E, Javier H, Sagar E, Carmen J, Karen J, Radha B, Tulinius H, Thorlacius S, Eerola H, Amritalinna H, Mary Ann K, Jessy OP. Average risks of breast and ovarian cancer associated with BRCA1 or BRCA2 mutations detected in case series unselected for family history: a combined analysis of 222 studies. Am J Hum Yasmine. 2003;72:1117-30.  2. Phoebe N, Yue M, Tejal G.  BRCA1 and BRCA2 Hereditary Breast and Ovarian Cancer. Gene Reviews online. 2013.  3. Denis YC, Rahel S, Hortencia G, Cole S. Breast cancer risk among male BRCA1 and BRCA2 mutation carriers. J Natl Cancer Inst. 2007;99:1811-4.  4. Manuel KRAUSE, Dandy I, Kane J, Jelani E, Nory Hernandez. Risk of breast cancer in male BRCA2 carriers. J Med Yasmine. 2010;47:710-1.  5. National Comprehensive Cancer Network. Clinical practice guidelines in oncology, colorectal cancer screening. Available online (registration required). 2015.  6. Kuldeep SIU, Irvin J, Robert J, Phil LA, Danial MS, Elizabet C. Lifetime cancer risks in individuals with germline PTEN mutations. Clin Cancer Res.  2012;18:400-7.  7. Carey COATES. Cowden Syndrome: A Critical Review of the Clinical Literature. J Yasmine . 2009:18:13-27.  8. Brielle QUILES, Jeremi D, Luci S, Evie P, Geraldine T, Yuridia M, Bayron B, Charmaine H, Alysha R, Cortney K, Sendy L, Manuel DG, Rachel D, Geovanni DF, Julia MR, The Breast Cancer Susceptibility Collaboration ([place]) & Kimberley PAREKH. PABLO mutations that cause ataxia-telangiectasia are breast cancer susceptibility alleles. Nature Genetics. 2006;38:873-875  9. Kings N , Shirin Y, Iwona J, Koffi L, Jem GM , Tiffanie ML, Gallinger S, Demarco AG, Syngal S, Deja ML, Debra J , Mariann R, Tawny SZ, Darrick JR, Muna VE, Jessica M, Vogelstein B, Tashia N, Ruchi RH, Laurent KW, and Allyssa AP. PABLO mutations in patients with hereditary pancreatic cancer. Cancer Discover. 2012;2:41-46  10. [place] OROPEZA, et al. Breast-Cancer Risk in Families with Mutations in PALB2. NEJM. 2014; 371(6):497-506.  11. CHEK2 Breast Cancer Case-Control Consortium. CHEK2*1100delC and susceptibility to breast cancer: A collaborative analysis involving 10,860 breast cancer cases and 9,065 controls from 10 studies. Am J Hum Yasmine, 74 (2004), pp. 2511-6901  12. Caesar T, Mag S, Laxmi K, et al. Spectrum of Mutations in BRCA1, BRCA2, CHEK2, and TP53 in Families at High Risk of Breast Cancer. LESLIE. 2006;295(12):6003-8601.   13. Shannan TURNER, Adonis GARSIA, Chris A, et al. Risk of breast cancer in women with a CHEK2 mutation with and without a family history of breast cancer. J Clin Oncol. 2011;29:1425-2626.  14. Masood H, Wei E, Amairani SJ, et al. Contribution of germline mutations in the RAD51B, RAD51C, and RAD51D genes to ovarian cancer in the population. J Clin Oncol. 2015;33(26):3555-9381. Doi:10.1200/JCO.2015.61.2408.  15. Genet T, Priscila JACOBS, Portillo P, et al. Mutations in BRIP1 confer high risk of ovarian cancer. Isha Yasmine. 2011;43(11):5316-8381. doi:10.1038/ng.955.

## 2019-08-27 NOTE — TELEPHONE ENCOUNTER
RECORDS STATUS - ALL OTHER DIAGNOSIS      RECORDS RECEIVED FROM: [facility]   DATE RECEIVED: 9/5/2019    NOTES STATUS DETAILS   OFFICE NOTE from referring provider Re Hernández GC   OFFICE NOTE from medical oncologist Complete  [facility]   OPERATIVE REPORT     LABS     PATHOLOGY REPORTS     ANYTHING RELATED TO DIAGNOSIS     GENONOMIC TESTING     TYPE:     IMAGING (NEED IMAGES & REPORT)     CT SCANS     MRI     MAMMO Complete  PACS   ULTRASOUND     PET       Action    Action Taken 8/27/2019 1:58pm     Called [facility] to have IMG pushed to PACS.     Tried calling pt to check out outside genetic testing. Left a vm requesting a call back.     3:16pm   I received a call back from zachary Lara. She is interested in rescheduling her appt. I gave her the [facility]  number so she can connect with the scheduling team Ph: [phone].
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
## 2019-09-05 ENCOUNTER — PRE VISIT (OUTPATIENT)
Dept: ONCOLOGY | Facility: CLINIC | Age: 39
End: 2019-09-05

## 2019-09-05 ENCOUNTER — ONCOLOGY VISIT (OUTPATIENT)
Dept: ONCOLOGY | Facility: CLINIC | Age: 39
End: 2019-09-05
Attending: CLINICAL NURSE SPECIALIST
Payer: COMMERCIAL

## 2019-09-05 VITALS
DIASTOLIC BLOOD PRESSURE: 62 MMHG | TEMPERATURE: 98.1 F | BODY MASS INDEX: 39.11 KG/M2 | HEART RATE: 88 BPM | HEIGHT: 67 IN | WEIGHT: 249.2 LBS | OXYGEN SATURATION: 96 % | SYSTOLIC BLOOD PRESSURE: 96 MMHG | RESPIRATION RATE: 18 BRPM

## 2019-09-05 DIAGNOSIS — Z12.39 BREAST CANCER SCREENING, HIGH RISK PATIENT: Primary | ICD-10-CM

## 2019-09-05 PROCEDURE — G0463 HOSPITAL OUTPT CLINIC VISIT: HCPCS

## 2019-09-05 PROCEDURE — 99215 OFFICE O/P EST HI 40 MIN: CPT | Performed by: CLINICAL NURSE SPECIALIST

## 2019-09-05 ASSESSMENT — MIFFLIN-ST. JEOR: SCORE: 1847.99

## 2019-09-05 ASSESSMENT — PAIN SCALES - GENERAL: PAINLEVEL: NO PAIN (0)

## 2019-09-05 NOTE — PROGRESS NOTES
"Oncology Rooming Note    September 5, 2019 10:16 AM   Jacy Zhang is a 38 year old female who presents for:    Chief Complaint   Patient presents with     Oncology Clinic Visit     Initial Vitals: BP 96/62   Ht 1.702 m (5' 7\")   Wt 113 kg (249 lb 3.2 oz)   BMI 39.03 kg/m   Estimated body mass index is 39.03 kg/m  as calculated from the following:    Height as of this encounter: 1.702 m (5' 7\").    Weight as of this encounter: 113 kg (249 lb 3.2 oz). Body surface area is 2.31 meters squared.  No Pain (0) Comment: Data Unavailable   No LMP recorded. (Menstrual status: IUD).  Allergies reviewed: Yes  Medications reviewed: Yes    Medications: Medication refills not needed today.  Pharmacy name entered into EPIC:    [facility] [place] [address] [facility] [place] [address] [place] [address] [id number] [place] - [address] [facility] [id number] [place] [facility] [place] [facility] [address] [facility]    Clinical concerns: no        America Leonard MA          "
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
## 2019-09-05 NOTE — PATIENT INSTRUCTIONS
Individualized Surveillance Plan for women  With 20% or greater lifetime risk of breast cancer   Per NCCN Breast Cancer Screening and Diagnosis Guidelines Version 1.2019    Recommended screening Test or procedure Last done Next Scheduled    Clinical encounter Ongoing risk assessment, risk reduction counseling and clinical breast exam, every 6-12 months. Refer to genetic counseling if not already done.   9/5/2019   May 2020   However, some family histories with breast cancers at a very young age, may warrant screening starting earlier.    *May begin at age 40 if breast cancers in the family occur at later ages.    Annual mammogram beginning 10 years younger than the earliest breast cancer in the family but not prior to age 30.    Recommend annual breast MRI to begin 10 years younger than the earliest breast cancer in the family but not prior to age 25.    Breast MRIs are preferably done on day 7-15 of the menstrual cycle in premenopausal women. 5/15/2019: Screening tomosynthesis mammogram, BI-RADS 1.    No history of breast MRI Breast MRI in November    Next exam: May 2020 followed by tomosynthesis mammogram (no sooner than 5/16)   Breast screening for patients at high risk due to thoracic radiation before 30 years old    Begin 10 years post radiation treatment or age 25.   Annual mammogram beginning 10 years after radiation but not prior to age 30    Annual breast MRI, preferably on day 7-15 of menstrual cycle for premenopausal women.       NA     NA   Women who have a lifetime risk of >20% based on history of LCIS or ADH/ALH Annual screening mammogram beginning at age of LCIS or ADH/ALH but not prior to age 30.    Consider annual MRI to begin at age of diagnosis of LCIS or ADH/ALH but not prior to age 25.    Consider risk reducing strategies.   NA   NA    Recommend risk reducing strategies for women with 1.7% 5 year risk of breast cancer. 1.8% risk for breast cancer within the next 5 years; she may consider low  dose tamoxifen for prevention      Patient Education   Understanding Breast Density  What is breast density?  Breasts are made of connective tissue, glandular tissue and fatty tissue. Dense breasts have a lot of the first two types of tissue, but not much fat.  Why is it important?  Having dense breasts means you have a slightly higher risk of getting breast cancer. It also means your mammograms will be harder to read. This is because both dense tissue and lumps look white on a mammogram. Fatty tissue looks black. The pictures below show the 4 levels of breast density:     How do I know if I have dense breasts?  Only a mammogram can tell you if you have dense breasts. The person who reviews your mammogram (radiologist) will give your breasts a density score based on the levels shown above.  What should I do?  Talk to your doctor about your options. But know that mammograms are proven to reduce cancer deaths. Plus, a yearly mammogram is even more important for women who have a higher risk of breast cancer. Your doctor may order other tests as well.  You should also know how your breasts look and feel. Any time you feel or see something that isn't normal, tell your doctor.  For informational purposes only. Not to replace the advice of your health care provider. Images courtesy American College of Radiology (ACR)   and Society of Breast Imaging (SBI). Used with permission. Text copyright   2014 [employer]. All rights reserved. [employer] 875376 - 08/14.       Patient Education     Magnetic Resonance Imaging (MRI) of the Breast     During a breast MRI, you lie face down on a platform that slides into a tubelike machine called a scanner.     Magnetic resonance imaging (MRI) of the breast is an imaging test that uses strong magnets and radio waves to form pictures of the inside of the breast. It also creates images of the tissues that surround the breast. Breast MRI is used to check for problems, such as a  leaking breast implant or a suspicious lump or mass. It can also be used to help determine if breast cancer is present and aid in diagnosis and management. Most MRI tests take 30 to 60 minutes. Depending on the type of MRI you are having, the test may take longer. Give yourself extra time to check in.   Before your test    Follow any directions you are given for taking medicines and for not eating or drinking before the test.    Follow your normal daily routine unless your provider tells you otherwise.    You ll be asked to remove your watch, hearing aids, credit cards, pens, eyeglasses, and other metal objects.    You may be asked to remove your makeup. Makeup may contain some metal.  MRI uses strong magnets. Metal is affected by magnets and can distort the image. The magnet used in MRI can cause metal objects in your body to move. If you have a metal implant, you may not be able to have an MRI unless the implant is certified as MRI safe. People with these implants should not have an MRI:    Ear (cochlear) implants    Certain clips used for brain aneurysms    Certain metal coils put in blood vessels    Most defibrillators    Most pacemakers  The radiologist or technologist may ask you if you:    Have had stereotactic breast biopsy    Have a pacemaker or implanted cardiac defibrillator    Have an artificial body part (prosthesis)    Have metal rods, screws, plates, or splinters in your body    Wear a medicated adhesive patch    Have tattoos or body piercings. Some tattoo inks contain metal.    Have implanted nerve stimulators or drug-infusion ports    Work with metal    Have braces    Have a bullet or other metal in your body  Tell the radiologist or technologist if you:    Are allergic to any medicines    Are pregnant or think you may be pregnant    Are afraid of small, enclosed spaces (claustrophobic)    Have any serious health problems (If you have kidney disease or a liver transplant  you may not be able to have  the contrast material used for MRI)    Have had previous surgeries    Are breastfeeding   During your test    You may be asked to wear a hospital gown.    You may be given earplugs to wear if you desire.    You may be injected with contrast through an intravenous (IV) line in your hand or arm. This is a special dye that makes the MRI image sharp that may be needed depending on the reason for your exam.    You ll lie on a platform that slides into a tube-like machine called a scanner. You ll be on your stomach with your breasts placed through openings in the platform.    Remain as still as you can while the camera takes the pictures. This will ensure the best images.  After your test    You can get back to normal activities right away.    If you were given contrast, it will pass naturally through your body within a day.    Drink lots of water so that the dye passes quickly out of your body.  Getting your results  Your healthcare provider will discuss the test results with you during a follow-up appointment or over the phone.  Date Last Reviewed: 2/1/2017 2000-2018 [employer]. [address] [place] [address], [place] [address]. All rights reserved. This information is not intended as a substitute for professional medical care. Always follow your healthcare professional's instructions.

## 2019-09-13 NOTE — LETTER
"    Cancer Risk Management  Program [facility]  [facility]  Mailing Address  Cancer Risk Management Program  [address]  [place] [address]    New patient appointments  [phone]  September 5, 2019    Jacy Zhang  [address] [place] [address]      Dear Jacy Louie  [address] [place] [address]    To whom it may concern    Jacy Zhang has a lifetime risk for breast cancer of secondary to having a family history of breast cancer  in her sister at age 40 and her mother at age 52, 57, and 62.      She also has a history of breast cancer in her maternal aunt at age 60, maternal grandmother at age 82, paternal grandmother at age 52, paternal great aunt, paternal cousin and other cancers including a maternal grandfather with bladder cancer at 63 and kidney cancer at 76 and maternal great aunt with colon cancer at 76.  Her paternal grandfather had prostate cancer in and he had 3 sisters with breast cancer at unknown ages.     She is considered to at high risk for breast cancer and has a 38.3% lifetime risk for breast cancer by the ENEDINA model. She has an 1.8% risk for breast cancer within 5 years by the ENEDINA model.    US Preventative Task Force Service guidelines for breast cancer screening do not apply to this patient because she has a family history placing her in  the high risk category for  breast cancer.      To quote the US Preventative Task Force Service, \"These recommendations apply to asymptomatic women aged 40 years or older who do not have preexisting breast cancer or a previously diagnosed high-risk breast lesion and who are not at high risk for breast cancer because of a known underlying genetic mutation (such as a BRCA1 or BRCA2 gene mutation or other " "familial breast cancer syndrome) or a history of chest radiation at a young age.\"    According to guidelines from the National Comprehensive Cancer Network (NCCN) , she should have breast cancer screening with:  1. Annual mammograms   2. Annual breast  MRI  2. Clinical breast exam by a physician or qualified advanced practice provider every 6-12  months  This is to certify that the above screenings are medically necessary and appropriate for the above patient -Jacy Zhang [id number] .    Please contact this office if further information is needed.    Sincerely,    Mar Haines, Clinical Nurse Specialist  Cancer Risk Management Program  [facility]   [address] [place] [address] [address]  [place] [address]    phone:  [phone]  fax: [phone]                                 " Bexarotene Counseling:  I discussed with the patient the risks of bexarotene including but not limited to hair loss, dry lips/skin/eyes, liver abnormalities, hyperlipidemia, pancreatitis, depression/suicidal ideation, photosensitivity, drug rash/allergic reactions, hypothyroidism, anemia, leukopenia, infection, cataracts, and teratogenicity.  Patient understands that they will need regular blood tests to check lipid profile, liver function tests, white blood cell count, thyroid function tests and pregnancy test if applicable.

## 2019-11-07 ENCOUNTER — HEALTH MAINTENANCE LETTER (OUTPATIENT)
Age: 39
End: 2019-11-07

## 2019-11-08 ENCOUNTER — ALLIED HEALTH/NURSE VISIT (OUTPATIENT)
Dept: NURSING | Facility: CLINIC | Age: 39
End: 2019-11-08
Payer: COMMERCIAL

## 2019-11-08 DIAGNOSIS — Z23 NEED FOR PROPHYLACTIC VACCINATION AND INOCULATION AGAINST INFLUENZA: Primary | ICD-10-CM

## 2019-11-08 PROCEDURE — 90686 IIV4 VACC NO PRSV 0.5 ML IM: CPT

## 2019-11-08 PROCEDURE — 90471 IMMUNIZATION ADMIN: CPT

## 2019-11-26 DIAGNOSIS — F41.9 ANXIETY: ICD-10-CM

## 2019-11-26 RX ORDER — SERTRALINE HYDROCHLORIDE 100 MG/1
TABLET, FILM COATED ORAL
Qty: 90 TABLET | Refills: 1 | Status: SHIPPED | OUTPATIENT
Start: 2019-11-26 | End: 2020-05-26

## 2019-11-26 NOTE — TELEPHONE ENCOUNTER
Associated diagnosis is anxiety. Depression is not on the problem list.   Prescription approved per [facility] Refill Protocol.  Gisela Cosby RN

## 2019-11-26 NOTE — TELEPHONE ENCOUNTER
"Requested Prescriptions   Pending Prescriptions Disp Refills     sertraline (ZOLOFT) 100 MG tablet [Pharmacy Med Name: SERTRALINE HCL 100MG TABS]  Last Written Prescription Date:  5/6/2019  Last Fill Quantity: 90 tablet,  # refills: 1   Last office visit: 5/6/2019 with prescribing provider:  Radha     Future Office Visit:       90 tablet 1     Sig: TAKE ONE TABLET BY MOUTH ONCE DAILY       SSRIs Protocol Passed - 11/26/2019  8:40 AM    PHQ-9 SCORE 5/6/2019   PHQ-9 Total Score 1     MAHOGANY-7 SCORE 5/6/2019   Total Score 2             Passed - Recent (12 mo) or future (30 days) visit within the authorizing provider's specialty     Patient has had an office visit with the authorizing provider or a provider within the authorizing providers department within the previous 12 mos or has a future within next 30 days. See \"Patient Info\" tab in inbasket, or \"Choose Columns\" in Meds & Orders section of the refill encounter.              Passed - Medication is active on med list        Passed - Patient is age 18 or older        Passed - No active pregnancy on record        Passed - No positive pregnancy test in last 12 months        "

## 2019-11-27 ENCOUNTER — NURSE TRIAGE (OUTPATIENT)
Dept: NURSING | Facility: CLINIC | Age: 39
End: 2019-11-27

## 2019-11-27 NOTE — TELEPHONE ENCOUNTER
Patient calling requesting status of refill for zoloft. Reviewed refill for Zoloft sent 11/26/19 to [facility]. Caller verbalized understanding. Denies further questions.    Aditi Miller RN  [facility]

## 2020-01-28 ENCOUNTER — OFFICE VISIT (OUTPATIENT)
Dept: FAMILY MEDICINE | Facility: CLINIC | Age: 40
End: 2020-01-28
Payer: COMMERCIAL

## 2020-01-28 VITALS
OXYGEN SATURATION: 95 % | HEIGHT: 67 IN | WEIGHT: 257 LBS | BODY MASS INDEX: 40.34 KG/M2 | HEART RATE: 94 BPM | SYSTOLIC BLOOD PRESSURE: 130 MMHG | TEMPERATURE: 99.1 F | DIASTOLIC BLOOD PRESSURE: 72 MMHG

## 2020-01-28 DIAGNOSIS — Z97.5 IUD (INTRAUTERINE DEVICE) IN PLACE: Primary | ICD-10-CM

## 2020-01-28 PROCEDURE — 58301 REMOVE INTRAUTERINE DEVICE: CPT | Performed by: NURSE PRACTITIONER

## 2020-01-28 ASSESSMENT — MIFFLIN-ST. JEOR: SCORE: 1878.37

## 2020-01-28 NOTE — PROGRESS NOTES
"Subjective   Jacy Zhang is a 39 year old female who presents to clinic today for the following health issues:    HPI     Here for IUD removal - Was placed outside of  on 03/20/2018.  Going to start trying to get pregnant.       Reviewed and updated as needed this visit by provider:  Tobacco  Allergies  Meds  Problems  Med Hx  Surg Hx  Fam Hx         Review of Systems   Constitutional, HEENT, cardiovascular, pulmonary, GI, , musculoskeletal, neuro, skin, endocrine and psych systems are negative, except as otherwise noted in the HPI.          Objective   /72 (BP Location: Left arm, Patient Position: Chair, Cuff Size: Adult Large)   Pulse 94   Temp 99.1  F (37.3  C) (Oral)   Ht 1.702 m (5' 7\")   Wt 116.6 kg (257 lb)   SpO2 95%   Breastfeeding No   BMI 40.25 kg/m   Body mass index is 40.25 kg/m .  Physical Exam   GENERAL: healthy, alert, well nourished, well hydrated, no distress  RESP: lungs clear to auscultation - no rales, no rhonchi, no wheezes  CV: regular rates and rhythm, normal S1 S2, no S3 or S4 and no murmur, no click or rub -  - female: cervix- normal, adnexae- normal; uterus- normal, no masses, no discharge; strings of IUD noted    Verbal consent for IUD removal speculum easily inserted with normal cervical exam noted.  IUD strings long and easily visualized.  String secured with ring forceps and gently steadily pulled with easy removal of IUD.  Entire IUD intact.  Minimal bleeding and discomfort.  Tolerated well.          Assessment & Plan   Jacy was seen today for iud.    Diagnoses and all orders for this visit:    IUD (intrauterine device) in place - Mirena 3/20/2018, wants out today without replacement to try to conceive  Easy removal of IUD.  At home instructions provided including minimal bleeding and discomfort after procedure.  Patient to be seen if she has excessive bleeding or discomfort noted.  Patient is trying to conceive encourage daily prenatal " "vitamins.  Follow-up with GYNWalker Louie verbalizes understanding of plan of care and is in agreement.   -     REMOVE INTRAUTERINE DEVICE    BMI:   Estimated body mass index is 39.03 kg/m  as calculated from the following:    Height as of 9/5/19: 1.702 m (5' 7\").    Weight as of 9/5/19: 113 kg (249 lb 3.2 oz).   Weight management plan: Discussed healthy diet and exercise guidelines    See Patient Instructions    Return if symptoms worsen or fail to improve.     Becka San, GOLD-BC     [address] [address]  [email or web address]   Office: [phone]      "

## 2020-01-30 NOTE — LETTER
6/26/2019         RE: Jacy Zhang  [address]  [place] [address]        Dear Colleague,    Thank you for referring your patient, Jacy Zhang, to the [facility]. Please see a copy of my visit note below.    [facility] - PRIMARY CARE SKIN    CC: skin cancer screening (full-body)  SUBJECTIVE:   Jacy Zhang is a(n) 38 year old female who presents to clinic today for a full-body skin exam.    No bothersome lesions noticed by the patient or other skin concerns.  A mole on the right cheek has been present since a young age.  A spot on the left thigh may be enlarging.    Jacy requests advice for facial products.    Personal Medical History  Skin cancer: NO  Eczema Psoriasis Autoimmune   NO NO NO     Family Medical History  Skin cancer: YES - in father on forehead (unsure of type)  Eczema Psoriasis Autoimmune   NO NO NO     Sun Exposure History  Previous history of significant sun exposure:  Blistering sunburns: NO.  Tanning beds: NO.  Sunscreen usage: YES, SPF: 30.  UV-protective clothing usage: NO.  Wide-brimmed hat usage: NO.  UV-protective sunglasses usage: YES.  Mid-day sun avoidance: NO.    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    Refer to electronic medical record (EMR) for past medical history and medications.    INTEGUMENTARY/SKIN: NEGATIVE for worrisome rashes, moles or lesions  ROS: 14 point review of systems was negative except the symptoms listed above in the HPI.    This document serves as a record of the services and decisions personally performed and made by Kelly Ochoa MD and was created by Deuce Knight, a trained medical scribe, based on personal observations and provider statements to the medical scribe.  June 26, 2019 2:52 PM   Deuce Knight    OBJECTIVE:   GENERAL: healthy, alert and no distress.  SKIN: Mcmanus Skin Type - II.  This patient was examined from the top of the head to the bottom of the feet  including scalp, face, neck,  "trunk, buttocks, both arms, both legs, both hands, both feet, and all fingers and toes. The dermatoscope was used to help evaluate pigmented lesions.  Skin Pertinent Findings:  Face: Scattered brown, macule(s) most consistent with benign solar lentigo.    Upper extremities: Scattered brown macules of various sizes and shapes most consistent with (benign) melanocytic nevi.    Left lateral hip: 10 mm in size stuck-on appearing papules, raised, brown, coarse-textured, round lesion(s) most consistent with seborrheic keratoses.    Left lateral patella: 25 mm in length x 10 mm nevus spilus.    Left distal anterior thigh: slightly raised, red lesion(s) consistent with capillary hemangioma.    Back: scattered infrequent brown macules of various sizes and shapes most consistent with (benign) melanocytic nevi.    ASSESSMENT:     Encounter Diagnoses   Name Primary?     Skin cancer screening Yes     Family history of skin cancer      Solar lentigo      Multiple benign melanocytic nevi      Nevus spilus of left lower leg      Capillary hemangioma of skin        PLAN:   Patient Instructions   FUTURE APPOINTMENTS    Follow up in 2-3 years for a full-body skin cancer screening.    Consider scheduling to see Diogenes Patrick, clinic , for consultation of skin products.    Make sure to use a facial cleanser, facial moisturizer and sunscreen regularly. You may also consider use of a topical retinol and a Vitamin C serum.    SUN PROTECTION INSTRUCTIONS  Sun damage can lead to skin cancer and premature aging of the skin.      The best way to protect from sun damage to your skin is to avoid the sun during peak hours (10 am - 2 pm) even on overcast days.    Never use tanning beds. Tanning beds are associated with much higher risks of skin cancer.    All tanning damages the skin. Aim for ivory skin year round and you will have less trouble with your skin in years to come. There is no merit in getting \"a base tan\" before a warm " "weather vacation, as any tanning indicates your body's response to sun damage.    Stop smoking. Smokers have higher rates of skin cancer and also have premature skin wrinkling.    Use UPF sun-protective clothing, which while more expensive initially provides longer lasting coverage without having to worry about remembering to re-apply.  1. Wear a wide-brimmed hat and sunglasses.   2. Wear sun-protective clothing.  Proximex and other Create[employer] make sun protective clothing that are stylish, comfortable and cool.   LendingStar and other CreateTrips make UV arm sleeves suitable for golfing, gardening and other activities.    Sunscreen instructions:  1. Use sunscreens with Zinc Oxide, Titanium Dioxide or Avobenzone to protect from UVA rays.  2. Use SPF 30-50+ to protect from UVB rays.  3. Re-apply every 2 hours even if water resistant.  4. Apply on your face every day even when cloudy and even in the winter. UVA \"aging rays\" penetrate window glass and are just as strong in the winter as in the summer.    FYI  You should use about 3 tablespoons of sunscreen to protect your whole body. Thus a typical eight ounce bottle of sunscreen should last 4 applications. Remember, that the SPF rating is compromised if you don t apply enough. Most people only apply 1/2 - 1/3 of the amount they need. Also don t forget areas such as your ears, feet, upper back and harder to reach places. Keep in mind that these amounts should be increased for larger body sizes.    Sunscreens with titanium dioxide and/or zinc oxide in the active ingredients are physical blockers as opposed to chemical blockers. Chemical-free sunscreens should not irritate the skin.    Spray-on sunscreens may be used for touch-up application only, not as a base layer. Also, use with caution around small children due to inhalation risk.    SPF means sun protection factor, which is just the degree to which the sunscreen can protect against UVB rays. There is no " rating system for UVA rays. SPF is calculated as the time skin will burn when sunscreen is applied vs. skin without sunscreen.    Water resistant sunscreens should be re-applied every 1-2 hours.    Product Recommendations:    Consider use of sunscreen sticks with Zinc Oxide and Titanium Dioxide active ingredients such as Neutrogena Pure&Free Baby Sunscreen Stick.    Good examples include: Blue Lizard, EltaMD, Solbar    Good daily moisturizers with SPF: Vanicream, CeraVe.    For sensitive skin, consider : SkinMedica Essential Defense Mineral Shield Broad Spectrum SPF 35    Men: consider use of Neutrogena Triple Protect Facial Lotion    Avoid retinyl palmitate products.  Avoid combination products that include both sunscreen and insect repellant, as sunscreen should be applied every 2 hours, but insect repellant should not be applied as frequently.    For more information:  https://www.skincancer.org/prevention/sun-protection/sunscreen/sunscreens-safe-and-effective      The patient was counseled about sunscreens and sun avoidance. The patient was counseled to check the skin regularly and report any lesion that is new, changing, itching, scabbing, bleeding or otherwise bothersome. The patient was discharged ambulatory and in stable condition.    TT: 25 minutes.  CT: 15 minutes.    The information in this document, created by the medical scribe for me, accurately reflects the services I personally performed and the decisions made by me. I have reviewed and approved this document for accuracy prior to leaving the patient care area.  June 26, 2019 2:52 PM  Kelly Ochoa MD  [facility]    Again, thank you for allowing me to participate in the care of your patient.        Sincerely,        Kelly Ochoa MD     30-Jan-2020 19:07

## 2020-05-26 DIAGNOSIS — F41.9 ANXIETY: ICD-10-CM

## 2020-05-27 RX ORDER — SERTRALINE HYDROCHLORIDE 100 MG/1
100 TABLET, FILM COATED ORAL DAILY
Qty: 90 TABLET | Refills: 1 | Status: SHIPPED | OUTPATIENT
Start: 2020-05-27 | End: 2020-05-28

## 2020-05-27 NOTE — PROGRESS NOTES
"Jacy Zhang is a 39 year old female who is being evaluated via a billable telephone visit.      The patient has been notified of following:     \"This telephone visit will be conducted via a call between you and your physician/provider. We have found that certain health care needs can be provided without the need for a physical exam.  This service lets us provide the care you need with a short phone conversation.  If a prescription is necessary we can send it directly to your pharmacy.  If lab work is needed we can place an order for that and you can then stop by our lab to have the test done at a later time.    Telephone visits are billed at different rates depending on your insurance coverage. During this emergency period, for some insurers they may be billed the same as an in-person visit.  Please reach out to your insurance provider with any questions.    If during the course of the call the physician/provider feels a telephone visit is not appropriate, you will not be charged for this service.\"    Patient has given verbal consent for Telephone visit?  Yes    What phone number would you like to be contacted at? [phone]    How would you like to obtain your AVS?     Subjective     Jacy Zhang is a 39 year old female who presents via phone visit today for the following health issues:    HPI  Depression and Anxiety Follow-Up    She does feel as though her anxiety has slightly increased, but this is expected especially during the pandemic.    Is grateful for the medication and is wanting to continue.      IUD removed in January 2020.    Is wanting to conceive 3rd child.   Started with postpartum period after 2nd child was born.          How are you doing with your depression since your last visit? No change    How are you doing with your anxiety since your last visit?  No change    Are you having other symptoms that might be associated with depression or anxiety? No    Have you had a significant life " event? OTHER: Birth of Second child     Do you have any concerns with your use of alcohol or other drugs? No    Social History     Tobacco Use     Smoking status: Never Smoker     Smokeless tobacco: Never Used   Substance Use Topics     Alcohol use: Yes     Comment: 5/week     Drug use: No     PHQ 5/6/2019 5/28/2020   PHQ-9 Total Score 1 2   Q9: Thoughts of better off dead/self-harm past 2 weeks Not at all Not at all     MAHOGANY-7 SCORE 5/6/2019 5/28/2020   Total Score 2 5     Last PHQ-9 5/28/2020   1.  Little interest or pleasure in doing things 0   2.  Feeling down, depressed, or hopeless 0   3.  Trouble falling or staying asleep, or sleeping too much 0   4.  Feeling tired or having little energy 1   5.  Poor appetite or overeating 1   6.  Feeling bad about yourself 0   7.  Trouble concentrating 0   8.  Moving slowly or restless 0   Q9: Thoughts of better off dead/self-harm past 2 weeks 0   PHQ-9 Total Score 2   Difficulty at work, home, or with people Somewhat difficult     MAHOGANY-7  5/28/2020   1. Feeling nervous, anxious, or on edge 1   2. Not being able to stop or control worrying 1   3. Worrying too much about different things 1   4. Trouble relaxing 1   5. Being so restless that it is hard to sit still 0   6. Becoming easily annoyed or irritable 1   7. Feeling afraid, as if something awful might happen 0   MAHOGANY-7 Total Score 5   If you checked any problems, how difficult have they made it for you to do your work, take care of things at home, or get along with other people? Somewhat difficult     Suicide Assessment Five-step Evaluation and Treatment (SAFE-T)      How many servings of fruits and vegetables do you eat daily?  2-3    On average, how many sweetened beverages do you drink each day (Examples: soda, juice, sweet tea, etc.  Do NOT count diet or artificially sweetened beverages)?   0    How many days per week do you exercise enough to make your heart beat faster? 4    How many minutes a day do you exercise  "enough to make your heart beat faster? 10 - 19    How many days per week do you miss taking your medication? 0      Patient Active Problem List   Diagnosis     Abnormal glandular Papanicolaou smear of cervix     Hyperhidrosis     CARDIOVASCULAR SCREENING; LDL GOAL LESS THAN 160     Obesity (BMI 35.0-39.9 without comorbidity)     IUD (intrauterine device) in place - Mirena 3/20/2018, due for replacement 3/20/2023.     Family history of breast cancer in first degree relative - Mother age 51, sister age 40 \"stage zero\" had prophylactic mastectomy, maternal and paternal grandmothers.     Cervical high risk HPV (human papillomavirus) test positive     Anxiety     Past Surgical History:   Procedure Laterality Date     HC TOOTH EXTRACTION W/FORCEP         Social History     Tobacco Use     Smoking status: Never Smoker     Smokeless tobacco: Never Used   Substance Use Topics     Alcohol use: Yes     Comment: 5/week     Family History   Problem Relation Age of Onset     Breast Cancer Mother 52        also 57 and 62 (recurrences)     Hypertension Father      Hyperlipidemia Father      Breast Cancer Sister 40        DCIS, had prophylactic mastectomy, negative BRCA1/2 testing     Diabetes Maternal Grandmother      Breast Cancer Maternal Grandmother 82         at 90     Diabetes Maternal Grandfather         adult onset     Bladder Cancer Maternal Grandfather 63     Kidney Cancer Maternal Grandfather 76     Diabetes Paternal Grandmother      Breast Cancer Paternal Grandmother 52         shortly after diagnosis     Prostate Cancer Paternal Grandfather 81         at 83     Breast Cancer Paternal Cousin         unknown age of diagnosis     Breast Cancer Maternal Aunt 60     Colon Cancer Maternal Aunt 76        matrenal grandfather's sister ( at 76)     Breast Cancer Paternal Aunt 52        paternal great aunt (paternal grandmother's sister)     Breast Cancer Paternal Aunt         paternal grandfather's 3 sisters " all had breast cancer (unknown ages)     C.A.D. No family hx of      Alzheimer Disease No family hx of      Blood Disease No family hx of      Eye Disorder No family hx of      Osteoporosis No family hx of      Thyroid Disease No family hx of      Coronary Artery Disease No family hx of      Cerebrovascular Disease No family hx of          Current Outpatient Medications   Medication Sig Dispense Refill     sertraline (ZOLOFT) 100 MG tablet Take 1 tablet (100 mg) by mouth daily 90 tablet 3     No Known Allergies    Reviewed and updated as needed this visit by Provider         Review of Systems   Constitutional, HEENT, cardiovascular, pulmonary, gi and gu systems are negative, except as otherwise noted.       Objective   Reported vitals:  There were no vitals taken for this visit.   General:  healthy, alert and no distress  PSYCH: Alert and oriented times 3; coherent speech, normal   rate and volume, able to articulate logical thoughts, able   to abstract reason, no tangential thoughts, no hallucinations   or delusions  Her affect is normal  RESP: No cough, no audible wheezing, able to talk in full sentences  Remainder of exam unable to be completed due to telephone visits            Assessment/Plan:    Jacy was seen today for recheck medication.    Diagnoses and all orders for this visit:    Anxiety  PHQ 5/6/2019 5/28/2020   PHQ-9 Total Score 1 2   Q9: Thoughts of better off dead/self-harm past 2 weeks Not at all Not at all     MAHOGANY-7 SCORE 5/6/2019 5/28/2020   Total Score 2 5   Currently stable on serotonin specific reuptake inhibitor - Sertraline 100 mg daily.    Patient is desiring conception of 3rd child and considering risks versus benefits of serotonin specific reuptake inhibitor during pregnancy.  Reviewed pregnancy considerations with patient, discussed risks versus benefits.  Refill completed at today's visit.      -     sertraline (ZOLOFT) 100 MG tablet; Take 1 tablet (100 mg) by mouth  daily          Return in about 3 months (around 8/28/2020) for preventative visit/pap smear.      Phone call duration:  14 minutes  2:40 p.m. to 2:54 p.m.     ISHA Cuevas CNP

## 2020-05-27 NOTE — TELEPHONE ENCOUNTER
Prescription approved per [facility] Refill Protocol.    Silvia Thompson RN, BSN  [facility]

## 2020-05-28 ENCOUNTER — VIRTUAL VISIT (OUTPATIENT)
Dept: FAMILY MEDICINE | Facility: CLINIC | Age: 40
End: 2020-05-28
Payer: COMMERCIAL

## 2020-05-28 DIAGNOSIS — F41.9 ANXIETY: ICD-10-CM

## 2020-05-28 PROCEDURE — 99213 OFFICE O/P EST LOW 20 MIN: CPT | Mod: TEL | Performed by: NURSE PRACTITIONER

## 2020-05-28 PROCEDURE — 96127 BRIEF EMOTIONAL/BEHAV ASSMT: CPT | Mod: TEL | Performed by: NURSE PRACTITIONER

## 2020-05-28 RX ORDER — SERTRALINE HYDROCHLORIDE 100 MG/1
100 TABLET, FILM COATED ORAL DAILY
Qty: 90 TABLET | Refills: 3 | Status: SHIPPED | OUTPATIENT
Start: 2020-05-28 | End: 2020-11-27

## 2020-05-28 ASSESSMENT — ANXIETY QUESTIONNAIRES
5. BEING SO RESTLESS THAT IT IS HARD TO SIT STILL: NOT AT ALL
2. NOT BEING ABLE TO STOP OR CONTROL WORRYING: SEVERAL DAYS
3. WORRYING TOO MUCH ABOUT DIFFERENT THINGS: SEVERAL DAYS
6. BECOMING EASILY ANNOYED OR IRRITABLE: SEVERAL DAYS
7. FEELING AFRAID AS IF SOMETHING AWFUL MIGHT HAPPEN: NOT AT ALL
GAD7 TOTAL SCORE: 5
1. FEELING NERVOUS, ANXIOUS, OR ON EDGE: SEVERAL DAYS
IF YOU CHECKED OFF ANY PROBLEMS ON THIS QUESTIONNAIRE, HOW DIFFICULT HAVE THESE PROBLEMS MADE IT FOR YOU TO DO YOUR WORK, TAKE CARE OF THINGS AT HOME, OR GET ALONG WITH OTHER PEOPLE: SOMEWHAT DIFFICULT

## 2020-05-28 ASSESSMENT — PATIENT HEALTH QUESTIONNAIRE - PHQ9
5. POOR APPETITE OR OVEREATING: SEVERAL DAYS
SUM OF ALL RESPONSES TO PHQ QUESTIONS 1-9: 2

## 2020-05-29 ASSESSMENT — ANXIETY QUESTIONNAIRES: GAD7 TOTAL SCORE: 5

## 2020-11-27 DIAGNOSIS — F41.9 ANXIETY: ICD-10-CM

## 2020-11-27 RX ORDER — SERTRALINE HYDROCHLORIDE 100 MG/1
100 TABLET, FILM COATED ORAL DAILY
Qty: 90 TABLET | Refills: 1 | Status: SHIPPED | OUTPATIENT
Start: 2020-11-27 | End: 2021-05-24

## 2020-11-29 ENCOUNTER — HEALTH MAINTENANCE LETTER (OUTPATIENT)
Age: 40
End: 2020-11-29

## 2020-12-02 ENCOUNTER — TRANSFERRED RECORDS (OUTPATIENT)
Dept: HEALTH INFORMATION MANAGEMENT | Facility: CLINIC | Age: 40
End: 2020-12-02

## 2020-12-02 LAB
ABO + RH BLD: NORMAL
ABO + RH BLD: NORMAL
BLD GP AB SCN SERPL QL: NEGATIVE
HBV SURFACE AG SERPL QL IA: NEGATIVE
RUBELLA ABY IGG: NORMAL
TREPONEMA ANTIBODIES: NONREACTIVE

## 2021-01-27 ENCOUNTER — MEDICAL CORRESPONDENCE (OUTPATIENT)
Dept: HEALTH INFORMATION MANAGEMENT | Facility: CLINIC | Age: 41
End: 2021-01-27

## 2021-01-27 ENCOUNTER — TRANSFERRED RECORDS (OUTPATIENT)
Dept: HEALTH INFORMATION MANAGEMENT | Facility: CLINIC | Age: 41
End: 2021-01-27

## 2021-01-27 ENCOUNTER — PATIENT OUTREACH (OUTPATIENT)
Dept: FAMILY MEDICINE | Facility: CLINIC | Age: 41
End: 2021-01-27

## 2021-01-27 ENCOUNTER — TRANSCRIBE ORDERS (OUTPATIENT)
Dept: MATERNAL FETAL MEDICINE | Facility: CLINIC | Age: 41
End: 2021-01-27

## 2021-01-27 DIAGNOSIS — O26.90 PREGNANCY RELATED CONDITION: Primary | ICD-10-CM

## 2021-01-27 PROBLEM — R87.810 CERVICAL HIGH RISK HPV (HUMAN PAPILLOMAVIRUS) TEST POSITIVE: Status: ACTIVE | Noted: 2019-05-06

## 2021-01-27 NOTE — LETTER
March 1, 2021      Jacy Zhang  [address] [place] [address]        Dear ,    This letter is to remind you that you are due for your follow-up Pap smear and Human Papillomavirus (HPV) test.    Please call [phone] to schedule your appointment at your earliest convenience.    If you have completed the appointment outside of the [facility] system, please have the records forwarded to our office. We will update your chart for your provider to review before your next annual wellness visit.     Thank you for choosing [facility]!      Sincerely,    Your [facility] Care Team

## 2021-01-27 NOTE — LETTER
January 27, 2021      Jacy Zhang  [address] [place] [address]        Dear ,    At [facility], your health and wellness are our primary concern. That is why we are following up on your most recent positive high-risk Human Papillomavirus (HPV) test.    Please call [phone] to schedule an appointment for your recommended follow-up Pap smear and Human Papillomavirus (HPV) test at your earliest convenience.     If you have completed the appointment outside of the [facility] system, please have the records forwarded to our office. It appears you may have had this done at an appointment with [facility]'s [place] on 12/2/20, however we do not have the pap smear record. We will update your chart for your provider to review before your next annual wellness visit.     Thank you for choosing [facility]!      Sincerely,    Your [facility] Care Team

## 2021-01-27 NOTE — TELEPHONE ENCOUNTER
Patient due for cotest  Reminder letter done today.   Pap was reportedly done at prenatal visit on 12/2/20, but we do not have the records. Letter sent to patient

## 2021-02-08 ENCOUNTER — PRE VISIT (OUTPATIENT)
Dept: MATERNAL FETAL MEDICINE | Facility: CLINIC | Age: 41
End: 2021-02-08

## 2021-02-15 ENCOUNTER — OFFICE VISIT (OUTPATIENT)
Dept: MATERNAL FETAL MEDICINE | Facility: CLINIC | Age: 41
End: 2021-02-15
Attending: OBSTETRICS & GYNECOLOGY
Payer: COMMERCIAL

## 2021-02-15 ENCOUNTER — HOSPITAL ENCOUNTER (OUTPATIENT)
Dept: ULTRASOUND IMAGING | Facility: CLINIC | Age: 41
End: 2021-02-15
Attending: OBSTETRICS & GYNECOLOGY
Payer: COMMERCIAL

## 2021-02-15 DIAGNOSIS — O35.9XX0 FETAL ABNORMALITY AFFECTING MANAGEMENT OF MOTHER, SINGLE OR UNSPECIFIED FETUS: ICD-10-CM

## 2021-02-15 DIAGNOSIS — E66.813 CLASS 3 SEVERE OBESITY WITHOUT SERIOUS COMORBIDITY WITH BODY MASS INDEX (BMI) OF 40.0 TO 44.9 IN ADULT, UNSPECIFIED OBESITY TYPE (H): ICD-10-CM

## 2021-02-15 DIAGNOSIS — O26.90 PREGNANCY RELATED CONDITION: ICD-10-CM

## 2021-02-15 DIAGNOSIS — E66.01 CLASS 3 SEVERE OBESITY WITHOUT SERIOUS COMORBIDITY WITH BODY MASS INDEX (BMI) OF 40.0 TO 44.9 IN ADULT, UNSPECIFIED OBESITY TYPE (H): ICD-10-CM

## 2021-02-15 PROCEDURE — 76811 OB US DETAILED SNGL FETUS: CPT

## 2021-02-15 PROCEDURE — 99202 OFFICE O/P NEW SF 15 MIN: CPT | Mod: 25 | Performed by: OBSTETRICS & GYNECOLOGY

## 2021-02-15 PROCEDURE — 76811 OB US DETAILED SNGL FETUS: CPT | Mod: 26 | Performed by: OBSTETRICS & GYNECOLOGY

## 2021-02-15 NOTE — PROGRESS NOTES
ASHLEY New patient visit    Thank-you for referring your patient for a comprehensive ultrasound. Pregnancy is complicated by advanced maternal age. She had cell-free DNA screening showing the expected amounts of chromosomes 21, 18 & 13. Also complicated by maternal BMI > 40 and a new diagnosis of hypertension, in this pregnancy. She was recently prescribed Nifedipine and is planning to pick it up today. BPs were 158/80, 145/72, and 138/80 at her prenatal visits. She has a strong family history of hypertension but no personal diagnosis of chronic hypertension. However, she hasn't really had routine well check ups since her last pregnancy so she isn't sure if her BP has been increasing at all between pregnancies. She has not had gHTN or preeclampsia before. Her , who is an acute care nurse practitioner, has a BP cuff at home and they have been checking her BP there; it is typically 130/80 but no 160 systolic or 100-110 diastolic.     With regards to her BP, I do agree with the diagnosis of cHTN. I recommend baseline preeclampsia labs be obtained at her next OB visit, if not already done (not noted in prenatal records), and that she hold on starting the nifedipine until her BP is > 150 systolic or 100 diastolic. I also recommended starting low dose aspirin daily for her new diagnosis. We reviewed the differences between gestational hypertensive disease and chronic hypertension and the different follow up and delivery timing. If she has well controlled cHTN, I recommend delivery at 38-39 weeks gestation.     I also discussed the findings on today's ultrasound with the patient and her . I reviewed the limitations of ultrasound both in detecting aneuploidy and structural abnormalities. I reviewed my recommendation for further fetal cardiac imaging with pediatric cardiology.  Ultrasound can routinely detect 80-90% of structural abnormalities. I also recommend follow up here to complete the anatomic survey in 3-4  weeks. No additional aneuploidy screening was specifically recommended at this time, pending the results of the fetal echo and follow up US, since no clear fetal anomaly diagnosis has been made at this time.     Fetal echocardiogram and MFM follow up were scheduled today.     I spent 20 minutes in face to face counseling and consultation with Jacy and her  in a new patient visit. I also spent 15 minutes reviewing her outside prenatal records, labs and imaging.     Return to primary provider for continued prenatal care.    Vianney Perez MD

## 2021-02-15 NOTE — PROGRESS NOTES
Please see full imaging report from [employer] program under imaging tab.    Vianney Perez MD  Maternal Fetal Medicine

## 2021-03-01 NOTE — TELEPHONE ENCOUNTER
Patient due for Pap and HPV.    Reminder done today via letter and telephone call -vm full, Letter sent.

## 2021-03-09 ENCOUNTER — OFFICE VISIT (OUTPATIENT)
Dept: MATERNAL FETAL MEDICINE | Facility: CLINIC | Age: 41
End: 2021-03-09
Attending: OBSTETRICS & GYNECOLOGY
Payer: COMMERCIAL

## 2021-03-09 ENCOUNTER — HOSPITAL ENCOUNTER (OUTPATIENT)
Dept: ULTRASOUND IMAGING | Facility: CLINIC | Age: 41
End: 2021-03-09
Attending: OBSTETRICS & GYNECOLOGY
Payer: COMMERCIAL

## 2021-03-09 DIAGNOSIS — O35.9XX0 FETAL ABNORMALITY AFFECTING MANAGEMENT OF MOTHER, SINGLE OR UNSPECIFIED FETUS: Primary | ICD-10-CM

## 2021-03-09 DIAGNOSIS — E66.01 CLASS 3 SEVERE OBESITY WITHOUT SERIOUS COMORBIDITY WITH BODY MASS INDEX (BMI) OF 40.0 TO 44.9 IN ADULT, UNSPECIFIED OBESITY TYPE (H): ICD-10-CM

## 2021-03-09 DIAGNOSIS — E66.813 CLASS 3 SEVERE OBESITY WITHOUT SERIOUS COMORBIDITY WITH BODY MASS INDEX (BMI) OF 40.0 TO 44.9 IN ADULT, UNSPECIFIED OBESITY TYPE (H): ICD-10-CM

## 2021-03-09 PROCEDURE — 76816 OB US FOLLOW-UP PER FETUS: CPT | Mod: 26 | Performed by: OBSTETRICS & GYNECOLOGY

## 2021-03-09 PROCEDURE — 76816 OB US FOLLOW-UP PER FETUS: CPT

## 2021-03-17 ENCOUNTER — OFFICE VISIT (OUTPATIENT)
Dept: CARDIOLOGY | Facility: CLINIC | Age: 41
End: 2021-03-17
Payer: COMMERCIAL

## 2021-03-17 ENCOUNTER — HOSPITAL ENCOUNTER (OUTPATIENT)
Dept: CARDIOLOGY | Facility: CLINIC | Age: 41
Discharge: HOME | End: 2021-03-17
Attending: OBSTETRICS & GYNECOLOGY | Admitting: OBSTETRICS & GYNECOLOGY
Payer: COMMERCIAL

## 2021-03-17 DIAGNOSIS — O35.BXX0 ANOMALY OF HEART OF FETUS AFFECTING PREGNANCY, ANTEPARTUM, SINGLE OR UNSPECIFIED FETUS: Primary | ICD-10-CM

## 2021-03-17 DIAGNOSIS — E66.813 CLASS 3 SEVERE OBESITY WITHOUT SERIOUS COMORBIDITY WITH BODY MASS INDEX (BMI) OF 40.0 TO 44.9 IN ADULT, UNSPECIFIED OBESITY TYPE (H): ICD-10-CM

## 2021-03-17 DIAGNOSIS — E66.01 CLASS 3 SEVERE OBESITY WITHOUT SERIOUS COMORBIDITY WITH BODY MASS INDEX (BMI) OF 40.0 TO 44.9 IN ADULT, UNSPECIFIED OBESITY TYPE (H): ICD-10-CM

## 2021-03-17 PROCEDURE — 76825 ECHO EXAM OF FETAL HEART: CPT | Mod: 26 | Performed by: PEDIATRICS

## 2021-03-17 PROCEDURE — 93325 DOPPLER ECHO COLOR FLOW MAPG: CPT

## 2021-03-17 PROCEDURE — 93325 DOPPLER ECHO COLOR FLOW MAPG: CPT | Mod: 26 | Performed by: PEDIATRICS

## 2021-03-17 PROCEDURE — 99202 OFFICE O/P NEW SF 15 MIN: CPT | Mod: 25 | Performed by: PEDIATRICS

## 2021-03-17 PROCEDURE — 76827 ECHO EXAM OF FETAL HEART: CPT | Mod: 26 | Performed by: PEDIATRICS

## 2021-03-17 NOTE — PROGRESS NOTES
Fetal Cardiology Consultation    Patient:  Jacy Zhang MRN:  [id number]   YOB: 1980 Age:  40 year old   Date of Visit:  3/17/2021 PCP:  Greta Garcia PA-C   MADDY: 7/11/2021, by Est. Date of Conception EGA: 23w3d weeks     Dear Dr. Perez:    I had the pleasure of seeing Jacy Zhang at the [facility] in [place] on 3/17/2021 in consultation for fetal echocardiography results. She presented today accompanied by her partner. As you know, she is a 40 year old female with suspected fetal cardiac anomaly on obstetrical ultrasound (?abnormal 3VTV).    The fetal echocardiogram was technically challenging. Likely normal fetal echocardiogram. Normal fetal cardiac anatomy. Normal right and left ventricular size and function without hypertrophy. No evidence of diastolic dysfunction. No pericardial effusion. No arrhythmia.     I reviewed and interpreted the fetal echocardiogram today. I discussed the normal results with Ms. Zhang and her partner. While these results are normal, it is important to note that fetal echocardiography cannot exclude small atrial or ventricular septal defects, persistent ductus arteriosus, mild coarctation of the aorta, partial anomalous pulmonary venous return, minor anatomic valve anomalies, or coronary artery anomalies.     Thank you for allowing me to participate in Ms. Zhang's care. Please don't hesitate to contact me or the Fetal Cardiology team at [facility] with any questions or concerns.     I spent a total of 15 minutes on the date of the encounter doing chart review, patient history, documentation, counseling, and coordinating care.    Allen Poe MD  Pediatric Cardiology  [facility]  Phone [phone]

## 2021-03-19 ENCOUNTER — IMMUNIZATION (OUTPATIENT)
Dept: NURSING | Facility: CLINIC | Age: 41
End: 2021-03-19
Payer: COMMERCIAL

## 2021-03-19 PROCEDURE — 91300 PR COVID VAC PFIZER DIL RECON 30 MCG/0.3 ML IM: CPT

## 2021-03-19 PROCEDURE — 0001A PR COVID VAC PFIZER DIL RECON 30 MCG/0.3 ML IM: CPT

## 2021-03-31 NOTE — TELEPHONE ENCOUNTER
03/31/21 [employer] message sent to the pt to get the records form Pap and HPV completed at the [facility] in Dec 2020.

## 2021-04-09 ENCOUNTER — IMMUNIZATION (OUTPATIENT)
Dept: NURSING | Facility: CLINIC | Age: 41
End: 2021-04-09
Attending: INTERNAL MEDICINE
Payer: COMMERCIAL

## 2021-04-09 PROCEDURE — 0002A PR COVID VAC PFIZER DIL RECON 30 MCG/0.3 ML IM: CPT

## 2021-04-09 PROCEDURE — 91300 PR COVID VAC PFIZER DIL RECON 30 MCG/0.3 ML IM: CPT

## 2021-05-05 NOTE — TELEPHONE ENCOUNTER
Flo Hernandes,   Patient is lost to pap tracking follow-up. Attempts to contact pt have been made per reminder process and there has been no reply and/or no appt scheduled.      Pap Hx:  08/04/05: LSIL Pap  08/15/06: LSIL Pap  02/04/09: NIL Pap  05/04/10: NIL Pap  01/10/11: NIL Pap  04/10/12: NIL Pap  03/20/18: NIL Pap (care everywhere)  05/06/19: NIL Pap, + HR HPV (not 16 or 18) result. Plan cotest in 1 year.   12/2/20 Pap done at prenatal appt outside [facility], but not in scanned records. Letter sent to pt  02/25/21 Reminder call-vm full  03/1/21 Reminder call-vm full, Final Letter sent  03/31/21 [employer] message sent to the pt to get the records. No response from the pt, no records recieved.   05/5/21 Lost to follow-up for pap tracking, devora routed to provider

## 2021-05-20 ENCOUNTER — E-VISIT (OUTPATIENT)
Dept: FAMILY MEDICINE | Facility: CLINIC | Age: 41
End: 2021-05-20
Payer: COMMERCIAL

## 2021-05-20 DIAGNOSIS — F41.9 ANXIETY: ICD-10-CM

## 2021-05-20 PROCEDURE — 99422 OL DIG E/M SVC 11-20 MIN: CPT | Performed by: PHYSICIAN ASSISTANT

## 2021-05-24 RX ORDER — SERTRALINE HYDROCHLORIDE 100 MG/1
100 TABLET, FILM COATED ORAL DAILY
Qty: 90 TABLET | Refills: 1 | Status: SHIPPED | OUTPATIENT
Start: 2021-05-24 | End: 2021-05-26

## 2021-05-26 ENCOUNTER — TELEPHONE (OUTPATIENT)
Dept: FAMILY MEDICINE | Facility: CLINIC | Age: 41
End: 2021-05-26

## 2021-05-26 DIAGNOSIS — F41.9 ANXIETY: ICD-10-CM

## 2021-05-26 RX ORDER — SERTRALINE HYDROCHLORIDE 100 MG/1
100 TABLET, FILM COATED ORAL DAILY
Qty: 90 TABLET | Refills: 1 | Status: SHIPPED | OUTPATIENT
Start: 2021-05-26

## 2021-05-26 NOTE — TELEPHONE ENCOUNTER
[facility] calling to follow up on patients prescription that was to be sent to them. Informed it had been filled and sent to the [facility]. New order placed that needs to be sent to the [facility]. Darlene Romo LPN

## 2021-05-28 ENCOUNTER — OFFICE VISIT (OUTPATIENT)
Dept: CARDIOLOGY | Facility: CLINIC | Age: 41
End: 2021-05-28
Payer: COMMERCIAL

## 2021-05-28 VITALS
HEART RATE: 106 BPM | WEIGHT: 277.6 LBS | HEIGHT: 67 IN | DIASTOLIC BLOOD PRESSURE: 78 MMHG | SYSTOLIC BLOOD PRESSURE: 116 MMHG | BODY MASS INDEX: 43.57 KG/M2

## 2021-05-28 DIAGNOSIS — I10 BENIGN ESSENTIAL HYPERTENSION: ICD-10-CM

## 2021-05-28 DIAGNOSIS — R00.2 PALPITATIONS: Primary | ICD-10-CM

## 2021-05-28 DIAGNOSIS — Z82.49 FAMILY HISTORY OF ISCHEMIC HEART DISEASE: ICD-10-CM

## 2021-05-28 PROCEDURE — 99203 OFFICE O/P NEW LOW 30 MIN: CPT | Performed by: INTERNAL MEDICINE

## 2021-05-28 PROCEDURE — 93000 ELECTROCARDIOGRAM COMPLETE: CPT | Performed by: INTERNAL MEDICINE

## 2021-05-28 RX ORDER — NIFEDIPINE 30 MG
30 TABLET, EXTENDED RELEASE ORAL EVERY MORNING
COMMUNITY
Start: 2021-05-14

## 2021-05-28 RX ORDER — ASPIRIN 81 MG/1
81 TABLET, CHEWABLE ORAL DAILY
COMMUNITY

## 2021-05-28 RX ORDER — AMOXICILLIN 875 MG
TABLET ORAL
Status: ON HOLD | COMMUNITY
Start: 2021-05-27 | End: 2021-06-27

## 2021-05-28 RX ORDER — MULTIPLE VITAMINS W/ MINERALS TAB 9MG-400MCG
1 TAB ORAL DAILY
Status: ON HOLD | COMMUNITY
End: 2021-06-27

## 2021-05-28 RX ORDER — MECOBALAMIN 5000 MCG
15 TABLET,DISINTEGRATING ORAL DAILY
COMMUNITY

## 2021-05-28 ASSESSMENT — MIFFLIN-ST. JEOR: SCORE: 1966.94
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
## 2021-06-15 LAB — GROUP B STREP PCR: NEGATIVE

## 2021-06-17 ENCOUNTER — HOSPITAL ENCOUNTER (OUTPATIENT)
Dept: CARDIOLOGY | Facility: CLINIC | Age: 41
Discharge: HOME | End: 2021-06-17
Attending: INTERNAL MEDICINE | Admitting: INTERNAL MEDICINE
Payer: COMMERCIAL

## 2021-06-17 DIAGNOSIS — R00.2 PALPITATIONS: ICD-10-CM

## 2021-06-17 DIAGNOSIS — I10 BENIGN ESSENTIAL HYPERTENSION: ICD-10-CM

## 2021-06-17 DIAGNOSIS — Z82.49 FAMILY HISTORY OF ISCHEMIC HEART DISEASE: ICD-10-CM

## 2021-06-17 PROCEDURE — 93306 TTE W/DOPPLER COMPLETE: CPT

## 2021-06-17 PROCEDURE — 93306 TTE W/DOPPLER COMPLETE: CPT | Mod: 26 | Performed by: INTERNAL MEDICINE

## 2021-06-21 NOTE — RESULT ENCOUNTER NOTE
Called patient and left a VM to inform her echo looks normal no abnormalities per Dr. Chaney.  Instructed to refrain from caffeine and to stay well hydrated.  Left msg to call back if any questions.

## 2021-06-27 ENCOUNTER — ANESTHESIA EVENT (OUTPATIENT)
Dept: OBGYN | Facility: CLINIC | Age: 41
End: 2021-06-27
Payer: COMMERCIAL

## 2021-06-27 ENCOUNTER — HOSPITAL ENCOUNTER (INPATIENT)
Facility: CLINIC | Age: 41
LOS: 2 days | Discharge: HOME | End: 2021-06-29
Attending: OBSTETRICS & GYNECOLOGY | Admitting: OBSTETRICS & GYNECOLOGY
Payer: COMMERCIAL

## 2021-06-27 ENCOUNTER — ANESTHESIA (OUTPATIENT)
Dept: OBGYN | Facility: CLINIC | Age: 41
End: 2021-06-27
Payer: COMMERCIAL

## 2021-06-27 DIAGNOSIS — I10 ESSENTIAL HYPERTENSION, BENIGN: ICD-10-CM

## 2021-06-27 LAB
ABO + RH BLD: NORMAL
ABO + RH BLD: NORMAL
ALT SERPL W P-5'-P-CCNC: 12 U/L (ref 0–50)
AST SERPL W P-5'-P-CCNC: 10 U/L (ref 0–45)
BASOPHILS # BLD AUTO: 0 10E9/L (ref 0–0.2)
BASOPHILS NFR BLD AUTO: 0.3 %
BLD GP AB SCN SERPL QL: NORMAL
BLOOD BANK CMNT PATIENT-IMP: NORMAL
CREAT SERPL-MCNC: 0.66 MG/DL (ref 0.52–1.04)
CREAT UR-MCNC: 100 MG/DL
DIFFERENTIAL METHOD BLD: ABNORMAL
EOSINOPHIL # BLD AUTO: 0 10E9/L (ref 0–0.7)
EOSINOPHIL NFR BLD AUTO: 0.2 %
ERYTHROCYTE [DISTWIDTH] IN BLOOD BY AUTOMATED COUNT: 15.1 % (ref 10–15)
GFR SERPL CREATININE-BSD FRML MDRD: >90 ML/MIN/{1.73_M2}
HCT VFR BLD AUTO: 38.3 % (ref 35–47)
HGB BLD-MCNC: 12.5 G/DL (ref 11.7–15.7)
IMM GRANULOCYTES # BLD: 0.1 10E9/L (ref 0–0.4)
IMM GRANULOCYTES NFR BLD: 1 %
LABORATORY COMMENT REPORT: NORMAL
LYMPHOCYTES # BLD AUTO: 1.8 10E9/L (ref 0.8–5.3)
LYMPHOCYTES NFR BLD AUTO: 13.4 %
MCH RBC QN AUTO: 31.3 PG (ref 26.5–33)
MCHC RBC AUTO-ENTMCNC: 32.6 G/DL (ref 31.5–36.5)
MCV RBC AUTO: 96 FL (ref 78–100)
MONOCYTES # BLD AUTO: 1 10E9/L (ref 0–1.3)
MONOCYTES NFR BLD AUTO: 7.3 %
NEUTROPHILS # BLD AUTO: 10.3 10E9/L (ref 1.6–8.3)
NEUTROPHILS NFR BLD AUTO: 77.8 %
NRBC # BLD AUTO: 0 10*3/UL
NRBC BLD AUTO-RTO: 0 /100
PLATELET # BLD AUTO: 257 10E9/L (ref 150–450)
PROT UR-MCNC: 0.19 G/L
PROT/CREAT 24H UR: 0.19 G/G CR (ref 0–0.2)
RBC # BLD AUTO: 4 10E12/L (ref 3.8–5.2)
RUPTURE OF FETAL MEMBRANES BY ROM PLUS: POSITIVE
SARS-COV-2 RNA RESP QL NAA+PROBE: NEGATIVE
SPECIMEN EXP DATE BLD: NORMAL
SPECIMEN SOURCE: NORMAL
T PALLIDUM AB SER QL: NONREACTIVE
WBC # BLD AUTO: 13.3 10E9/L (ref 4–11)

## 2021-06-27 PROCEDURE — 250N000009 HC RX 250: Performed by: OBSTETRICS & GYNECOLOGY

## 2021-06-27 PROCEDURE — 370N000003 HC ANESTHESIA WARD SERVICE

## 2021-06-27 PROCEDURE — 250N000011 HC RX IP 250 OP 636: Performed by: ANESTHESIOLOGY

## 2021-06-27 PROCEDURE — C9803 HOPD COVID-19 SPEC COLLECT: HCPCS

## 2021-06-27 PROCEDURE — G0463 HOSPITAL OUTPT CLINIC VISIT: HCPCS | Mod: 25

## 2021-06-27 PROCEDURE — 120N000012 HC R&B POSTPARTUM

## 2021-06-27 PROCEDURE — 258N000003 HC RX IP 258 OP 636: Performed by: ANESTHESIOLOGY

## 2021-06-27 PROCEDURE — 86901 BLOOD TYPING SEROLOGIC RH(D): CPT | Performed by: OBSTETRICS & GYNECOLOGY

## 2021-06-27 PROCEDURE — 87635 SARS-COV-2 COVID-19 AMP PRB: CPT | Performed by: OBSTETRICS & GYNECOLOGY

## 2021-06-27 PROCEDURE — 3E0R3BZ INTRODUCTION OF ANESTHETIC AGENT INTO SPINAL CANAL, PERCUTANEOUS APPROACH: ICD-10-PCS | Performed by: ANESTHESIOLOGY

## 2021-06-27 PROCEDURE — 0HQ9XZZ REPAIR PERINEUM SKIN, EXTERNAL APPROACH: ICD-10-PCS | Performed by: OBSTETRICS & GYNECOLOGY

## 2021-06-27 PROCEDURE — 86900 BLOOD TYPING SEROLOGIC ABO: CPT | Performed by: OBSTETRICS & GYNECOLOGY

## 2021-06-27 PROCEDURE — 84156 ASSAY OF PROTEIN URINE: CPT | Performed by: OBSTETRICS & GYNECOLOGY

## 2021-06-27 PROCEDURE — 84450 TRANSFERASE (AST) (SGOT): CPT | Performed by: OBSTETRICS & GYNECOLOGY

## 2021-06-27 PROCEDURE — 84460 ALANINE AMINO (ALT) (SGPT): CPT | Performed by: OBSTETRICS & GYNECOLOGY

## 2021-06-27 PROCEDURE — 86780 TREPONEMA PALLIDUM: CPT | Performed by: OBSTETRICS & GYNECOLOGY

## 2021-06-27 PROCEDURE — 82565 ASSAY OF CREATININE: CPT | Performed by: OBSTETRICS & GYNECOLOGY

## 2021-06-27 PROCEDURE — 84112 EVAL AMNIOTIC FLUID PROTEIN: CPT | Performed by: OBSTETRICS & GYNECOLOGY

## 2021-06-27 PROCEDURE — 00HU33Z INSERTION OF INFUSION DEVICE INTO SPINAL CANAL, PERCUTANEOUS APPROACH: ICD-10-PCS | Performed by: ANESTHESIOLOGY

## 2021-06-27 PROCEDURE — 85025 COMPLETE CBC W/AUTO DIFF WBC: CPT | Performed by: OBSTETRICS & GYNECOLOGY

## 2021-06-27 PROCEDURE — 36415 COLL VENOUS BLD VENIPUNCTURE: CPT | Performed by: OBSTETRICS & GYNECOLOGY

## 2021-06-27 PROCEDURE — 250N000009 HC RX 250: Performed by: ANESTHESIOLOGY

## 2021-06-27 PROCEDURE — 258N000003 HC RX IP 258 OP 636: Performed by: OBSTETRICS & GYNECOLOGY

## 2021-06-27 PROCEDURE — 250N000013 HC RX MED GY IP 250 OP 250 PS 637: Performed by: OBSTETRICS & GYNECOLOGY

## 2021-06-27 PROCEDURE — 86850 RBC ANTIBODY SCREEN: CPT | Performed by: OBSTETRICS & GYNECOLOGY

## 2021-06-27 PROCEDURE — 722N000001 HC LABOR CARE VAGINAL DELIVERY SINGLE

## 2021-06-27 PROCEDURE — 59025 FETAL NON-STRESS TEST: CPT

## 2021-06-27 RX ORDER — NALOXONE HYDROCHLORIDE 0.4 MG/ML
0.4 INJECTION, SOLUTION INTRAMUSCULAR; INTRAVENOUS; SUBCUTANEOUS
Status: DISCONTINUED | OUTPATIENT
Start: 2021-06-27 | End: 2021-06-27

## 2021-06-27 RX ORDER — AMOXICILLIN 250 MG
1 CAPSULE ORAL 2 TIMES DAILY
Status: DISCONTINUED | OUTPATIENT
Start: 2021-06-27 | End: 2021-06-29 | Stop reason: HOSPADM

## 2021-06-27 RX ORDER — BISACODYL 10 MG
10 SUPPOSITORY, RECTAL RECTAL DAILY PRN
Status: DISCONTINUED | OUTPATIENT
Start: 2021-06-29 | End: 2021-06-29 | Stop reason: HOSPADM

## 2021-06-27 RX ORDER — IBUPROFEN 400 MG/1
800 TABLET, FILM COATED ORAL
Status: DISCONTINUED | OUTPATIENT
Start: 2021-06-27 | End: 2021-06-27

## 2021-06-27 RX ORDER — NALOXONE HYDROCHLORIDE 0.4 MG/ML
0.2 INJECTION, SOLUTION INTRAMUSCULAR; INTRAVENOUS; SUBCUTANEOUS
Status: DISCONTINUED | OUTPATIENT
Start: 2021-06-27 | End: 2021-06-27

## 2021-06-27 RX ORDER — CARBOPROST TROMETHAMINE 250 UG/ML
250 INJECTION, SOLUTION INTRAMUSCULAR
Status: DISCONTINUED | OUTPATIENT
Start: 2021-06-27 | End: 2021-06-29 | Stop reason: HOSPADM

## 2021-06-27 RX ORDER — SERTRALINE HYDROCHLORIDE 100 MG/1
100 TABLET, FILM COATED ORAL DAILY
Status: DISCONTINUED | OUTPATIENT
Start: 2021-06-28 | End: 2021-06-29 | Stop reason: HOSPADM

## 2021-06-27 RX ORDER — EPHEDRINE SULFATE 50 MG/ML
5 INJECTION, SOLUTION INTRAMUSCULAR; INTRAVENOUS; SUBCUTANEOUS
Status: DISCONTINUED | OUTPATIENT
Start: 2021-06-27 | End: 2021-06-29 | Stop reason: HOSPADM

## 2021-06-27 RX ORDER — ROPIVACAINE HYDROCHLORIDE 2 MG/ML
10 INJECTION, SOLUTION EPIDURAL; INFILTRATION; PERINEURAL ONCE
Status: COMPLETED | OUTPATIENT
Start: 2021-06-27 | End: 2021-06-27

## 2021-06-27 RX ORDER — HYDROCORTISONE 2.5 %
CREAM (GRAM) TOPICAL 3 TIMES DAILY PRN
Status: DISCONTINUED | OUTPATIENT
Start: 2021-06-27 | End: 2021-06-29 | Stop reason: HOSPADM

## 2021-06-27 RX ORDER — PRENATAL VIT/IRON FUM/FOLIC AC 27MG-0.8MG
1 TABLET ORAL DAILY
COMMUNITY

## 2021-06-27 RX ORDER — OXYTOCIN/0.9 % SODIUM CHLORIDE 30/500 ML
100-340 PLASTIC BAG, INJECTION (ML) INTRAVENOUS CONTINUOUS PRN
Status: DISCONTINUED | OUTPATIENT
Start: 2021-06-27 | End: 2021-06-27

## 2021-06-27 RX ORDER — FENTANYL CITRATE 50 UG/ML
50-100 INJECTION, SOLUTION INTRAMUSCULAR; INTRAVENOUS
Status: DISCONTINUED | OUTPATIENT
Start: 2021-06-27 | End: 2021-06-27

## 2021-06-27 RX ORDER — ONDANSETRON 2 MG/ML
4 INJECTION INTRAMUSCULAR; INTRAVENOUS EVERY 6 HOURS PRN
Status: DISCONTINUED | OUTPATIENT
Start: 2021-06-27 | End: 2021-06-29 | Stop reason: HOSPADM

## 2021-06-27 RX ORDER — OXYTOCIN 10 [USP'U]/ML
10 INJECTION, SOLUTION INTRAMUSCULAR; INTRAVENOUS
Status: DISCONTINUED | OUTPATIENT
Start: 2021-06-27 | End: 2021-06-29 | Stop reason: HOSPADM

## 2021-06-27 RX ORDER — MODIFIED LANOLIN
OINTMENT (GRAM) TOPICAL
Status: DISCONTINUED | OUTPATIENT
Start: 2021-06-27 | End: 2021-06-29 | Stop reason: HOSPADM

## 2021-06-27 RX ORDER — NALBUPHINE HYDROCHLORIDE 10 MG/ML
2.5-5 INJECTION, SOLUTION INTRAMUSCULAR; INTRAVENOUS; SUBCUTANEOUS EVERY 6 HOURS PRN
Status: DISCONTINUED | OUTPATIENT
Start: 2021-06-27 | End: 2021-06-29 | Stop reason: HOSPADM

## 2021-06-27 RX ORDER — ONDANSETRON 2 MG/ML
4 INJECTION INTRAMUSCULAR; INTRAVENOUS EVERY 6 HOURS PRN
Status: DISCONTINUED | OUTPATIENT
Start: 2021-06-27 | End: 2021-06-27

## 2021-06-27 RX ORDER — IBUPROFEN 400 MG/1
800 TABLET, FILM COATED ORAL EVERY 6 HOURS PRN
Status: DISCONTINUED | OUTPATIENT
Start: 2021-06-27 | End: 2021-06-29 | Stop reason: HOSPADM

## 2021-06-27 RX ORDER — OXYTOCIN 10 [USP'U]/ML
10 INJECTION, SOLUTION INTRAMUSCULAR; INTRAVENOUS
Status: DISCONTINUED | OUTPATIENT
Start: 2021-06-27 | End: 2021-06-27

## 2021-06-27 RX ORDER — TRANEXAMIC ACID 10 MG/ML
1 INJECTION, SOLUTION INTRAVENOUS EVERY 30 MIN PRN
Status: DISCONTINUED | OUTPATIENT
Start: 2021-06-27 | End: 2021-06-27

## 2021-06-27 RX ORDER — ACETAMINOPHEN 325 MG/1
650 TABLET ORAL EVERY 4 HOURS PRN
Status: DISCONTINUED | OUTPATIENT
Start: 2021-06-27 | End: 2021-06-27

## 2021-06-27 RX ORDER — OXYTOCIN/0.9 % SODIUM CHLORIDE 30/500 ML
1-24 PLASTIC BAG, INJECTION (ML) INTRAVENOUS CONTINUOUS
Status: DISCONTINUED | OUTPATIENT
Start: 2021-06-27 | End: 2021-06-27

## 2021-06-27 RX ORDER — LIDOCAINE 40 MG/G
CREAM TOPICAL
Status: DISCONTINUED | OUTPATIENT
Start: 2021-06-27 | End: 2021-06-27

## 2021-06-27 RX ORDER — SODIUM CHLORIDE, SODIUM LACTATE, POTASSIUM CHLORIDE, CALCIUM CHLORIDE 600; 310; 30; 20 MG/100ML; MG/100ML; MG/100ML; MG/100ML
INJECTION, SOLUTION INTRAVENOUS CONTINUOUS
Status: DISCONTINUED | OUTPATIENT
Start: 2021-06-27 | End: 2021-06-27

## 2021-06-27 RX ORDER — LIDOCAINE HYDROCHLORIDE AND EPINEPHRINE 15; 5 MG/ML; UG/ML
3 INJECTION, SOLUTION EPIDURAL
Status: COMPLETED | OUTPATIENT
Start: 2021-06-27 | End: 2021-06-27

## 2021-06-27 RX ORDER — TRANEXAMIC ACID 10 MG/ML
1 INJECTION, SOLUTION INTRAVENOUS EVERY 30 MIN PRN
Status: DISCONTINUED | OUTPATIENT
Start: 2021-06-27 | End: 2021-06-29 | Stop reason: HOSPADM

## 2021-06-27 RX ORDER — NIFEDIPINE 30 MG/1
30 TABLET, EXTENDED RELEASE ORAL AT BEDTIME
Status: DISCONTINUED | OUTPATIENT
Start: 2021-06-27 | End: 2021-06-29 | Stop reason: HOSPADM

## 2021-06-27 RX ORDER — OXYTOCIN/0.9 % SODIUM CHLORIDE 30/500 ML
340 PLASTIC BAG, INJECTION (ML) INTRAVENOUS CONTINUOUS PRN
Status: DISCONTINUED | OUTPATIENT
Start: 2021-06-27 | End: 2021-06-29 | Stop reason: HOSPADM

## 2021-06-27 RX ORDER — OXYTOCIN/0.9 % SODIUM CHLORIDE 30/500 ML
100 PLASTIC BAG, INJECTION (ML) INTRAVENOUS CONTINUOUS
Status: DISCONTINUED | OUTPATIENT
Start: 2021-06-27 | End: 2021-06-29 | Stop reason: HOSPADM

## 2021-06-27 RX ORDER — OXYCODONE AND ACETAMINOPHEN 5; 325 MG/1; MG/1
1 TABLET ORAL
Status: DISCONTINUED | OUTPATIENT
Start: 2021-06-27 | End: 2021-06-27

## 2021-06-27 RX ORDER — METHYLERGONOVINE MALEATE 0.2 MG/ML
200 INJECTION INTRAVENOUS
Status: DISCONTINUED | OUTPATIENT
Start: 2021-06-27 | End: 2021-06-29 | Stop reason: HOSPADM

## 2021-06-27 RX ORDER — AMOXICILLIN 250 MG
2 CAPSULE ORAL 2 TIMES DAILY
Status: DISCONTINUED | OUTPATIENT
Start: 2021-06-27 | End: 2021-06-29 | Stop reason: HOSPADM

## 2021-06-27 RX ORDER — ACETAMINOPHEN 325 MG/1
650 TABLET ORAL EVERY 4 HOURS PRN
Status: DISCONTINUED | OUTPATIENT
Start: 2021-06-27 | End: 2021-06-29 | Stop reason: HOSPADM

## 2021-06-27 RX ORDER — ONDANSETRON 4 MG/1
4 TABLET, ORALLY DISINTEGRATING ORAL EVERY 6 HOURS PRN
Status: DISCONTINUED | OUTPATIENT
Start: 2021-06-27 | End: 2021-06-29 | Stop reason: HOSPADM

## 2021-06-27 RX ADMIN — Medication 2 MILLI-UNITS/MIN: at 10:04

## 2021-06-27 RX ADMIN — SODIUM CHLORIDE, POTASSIUM CHLORIDE, SODIUM LACTATE AND CALCIUM CHLORIDE: 600; 310; 30; 20 INJECTION, SOLUTION INTRAVENOUS at 12:44

## 2021-06-27 RX ADMIN — SODIUM CHLORIDE, POTASSIUM CHLORIDE, SODIUM LACTATE AND CALCIUM CHLORIDE: 600; 310; 30; 20 INJECTION, SOLUTION INTRAVENOUS at 10:03

## 2021-06-27 RX ADMIN — Medication 12 ML/HR: at 12:27

## 2021-06-27 RX ADMIN — LIDOCAINE HYDROCHLORIDE AND EPINEPHRINE 3 ML: 15; 5 INJECTION, SOLUTION EPIDURAL at 12:40

## 2021-06-27 RX ADMIN — IBUPROFEN 800 MG: 400 TABLET ORAL at 16:46

## 2021-06-27 RX ADMIN — IBUPROFEN 800 MG: 400 TABLET ORAL at 22:02

## 2021-06-27 RX ADMIN — SENNOSIDES AND DOCUSATE SODIUM 1 TABLET: 8.6; 5 TABLET ORAL at 19:41

## 2021-06-27 RX ADMIN — NIFEDIPINE 30 MG: 30 TABLET, FILM COATED, EXTENDED RELEASE ORAL at 22:02

## 2021-06-27 RX ADMIN — ACETAMINOPHEN 650 MG: 325 TABLET, FILM COATED ORAL at 22:02

## 2021-06-27 RX ADMIN — SODIUM CHLORIDE, POTASSIUM CHLORIDE, SODIUM LACTATE AND CALCIUM CHLORIDE 500 ML: 600; 310; 30; 20 INJECTION, SOLUTION INTRAVENOUS at 11:30

## 2021-06-27 RX ADMIN — ACETAMINOPHEN 650 MG: 325 TABLET, FILM COATED ORAL at 16:46

## 2021-06-27 RX ADMIN — ROPIVACAINE HYDROCHLORIDE 10 ML: 2 INJECTION, SOLUTION EPIDURAL; INFILTRATION at 12:40

## 2021-06-27 ASSESSMENT — MIFFLIN-ST. JEOR: SCORE: 1977.7

## 2021-06-27 NOTE — PROVIDER NOTIFICATION
06/27/21 0602   Provider Notification   Provider Name/Title Dr Sood   Method of Notification Phone   Request Evaluate - Remote   Notification Reason Patient Arrived;Membrane Status;Labor Status;Uterine Activity;SVE;Status Update;Pain   Notified Dr Sood that pt's ROM Plus is positive, she is sekou every 2-4 minutes and rating 4/10. Discussed pt's Hx, blood pressures and medications. Per Dr Sood, admit to inpatient, add intrapartum orders to include nitrous, fentanyl, epidural with palacios, and pre-eclampsia labs. POC to give time for pt to go into labor on her own. Pt missed nifedipine dose yesterday, per MD no need to take it this am since BP WNL.

## 2021-06-27 NOTE — ANESTHESIA PROCEDURE NOTES
Epidural catheter Procedure Note  Pre-Procedure   Staff -        Anesthesiologist:  Devon Mak MD       Performed By: Anesthesiologist       Location: OB       Procedure Start/Stop Times: 6/27/2021 12:23 PM and 6/27/2021 12:40 PM       Pre-Anesthestic Checklist: patient identified, IV checked, site marked, risks and benefits discussed, informed consent, monitors and equipment checked and pre-op evaluation  Timeout:       Correct Patient: Yes        Correct Procedure: Yes        Correct Site: Yes        Correct Position: Yes   Procedure Documentation  Procedure: epidural catheter       Patient Position: sitting       Patient Prep/Sterile Barriers: sterile gloves, mask, patient draped       Skin prep: Betadine       Local skin infiltrated with 3 mL of 1% lidocaine.        Insertion Site: L3-4. (midline approach).       Technique: LORT air        Needle Type: ToWave Telecomy needle       Needle Gauge: 17.        Needle Length (Inches): 3.5        Catheter: 19 G.         Catheter threaded easily.         3.5 cm epidural space.         # of attempts: 1 and  # of redirects:  0    Assessment/Narrative         Paresthesias: No.      Test dose of 3 mL lidocaine 1.5% w/ 1:200,000 epinephrine at.         Test dose negative, 3 minutes after injection, for signs of intravascular, subdural, or intrathecal injection.       Insertion/Infusion Method: LORT air       Aspiration negative for Heme or CSF via Epidural Catheter.    Comments:  Pt tolerated procedure well.   No complications.    The epidural was bolused with 10 ml of 0.2% Ropivacaine in incremental doses and intermittent negative aspiration after negative test dose.

## 2021-06-27 NOTE — PROVIDER NOTIFICATION
06/27/21 1330   Provider Notification   Provider Name/Title Dr Sood   Method of Notification At Bedside   Notification Reason Status Update

## 2021-06-27 NOTE — PROGRESS NOTES
"OB  Patient is comfortable with epidural, not feeling contractions  /64   Temp 98.3  F (36.8  C) (Temporal)   Resp 16   Ht 1.702 m (5' 7\")   Wt 127 kg (280 lb)   LMP 10/10/2020   SpO2 98%   BMI 43.85 kg/m       Cx: /-2  IUPC and FSE placed due to difficulty monitoring with external monitoring.  FHT baseline 130, moderate variability, accels, reassuring, cat 1  Ctxns q2-3\", Pitocin @ 8 mu    A/P: 39 yo  @ 38 weeks, SROM, starting active labor, GBS neg  Continue expectant management with pitocin augmentation.  Positional changes to assist with fetal descent and labor progress.  Chronic HTN: PIH labs were normal, BP acceptable.   Samantha Sood MD, MD on 2021 at 1:20 PM  "

## 2021-06-27 NOTE — PLAN OF CARE
Pt up to BR with SBA x2. Pt able to void. Pt then tx via wheelchair to  room. Pt tolerated tx well. Report given to IZABELLA Brown RN.

## 2021-06-27 NOTE — PROGRESS NOTES
"OB  Patient is comfortable with epidural  /53   Temp 98.8  F (37.1  C) (Temporal)   Resp 14   Ht 1.702 m (5' 7\")   Wt 127 kg (280 lb)   LMP 10/10/2020   SpO2 97%   BMI 43.85 kg/m       Cx: /-  FHT baseline 140, moderate variability, accels, Cat 1  Ctxns slightly irregular, q2-4\", pitocin @ 4 mu    A/P: 39 yo  @ 38 weeks, SROM, active labor  Continue expectant management.  Increase pitocin as indicated.  Positional changes to assist with fetal descent.   Samantha Sood MD, MD on 2021 at 2:37 PM  "

## 2021-06-27 NOTE — H&P
[facility] Labor and Delivery History and Physical    Jacy Zhang MRN# [id number]   Age: 40 year old YOB: 1980     Date of Admission:  2021    Primary care provider: Becka San           Chief Complaint:   Jacy Zhang is a 40 year old female who is 38w0d pregnant and being admitted for labor management and SROM.          Pregnancy history:     OBSTETRIC HISTORY:    OB History    Para Term  AB Living   3 2 2 0 0 2   SAB TAB Ectopic Multiple Live Births   0 0 0 0 2      # Outcome Date GA Lbr Shon/2nd Weight Sex Delivery Anes PTL Lv   3 Current            2 Term         MARLYS   1 Term         MARLYS       EDC: Estimated Date of Delivery: 21    Prenatal Labs:   Lab Results   Component Value Date    ABO O 2021    RH Pos 2021    AS Neg 2021    HEPBANG Negative 2020    CHPCRT  2012     Negative for C. trachomatis rRNA by transcription mediated amplification.   A negative result by transcription mediated amplification does not preclude the   presence of C. trachomatis infection because results are dependent on proper   and adequate collection, absence of inhibitors, and sufficient rRNA to be   detected.    GCPCRT  2012     Negative for N. gonorrhoeae rRNA by transcription mediated amplification.   A negative result by transcription mediated amplification does not preclude the   presence of N. gonorrhoeae infection because results are dependent on proper   and adequate collection, absence of inhibitors, and sufficient rRNA to be   detected.    RUBELLAABIGG immune 2020    HGB 12.5 2021    HIV Negative 08/15/2006       GBS Status:   Lab Results   Component Value Date    GBS Negative 06/15/2021       Active Problem List  Patient Active Problem List   Diagnosis     Abnormal glandular Papanicolaou smear of cervix     Hyperhidrosis     CARDIOVASCULAR SCREENING; LDL GOAL LESS THAN 160     Obesity (BMI  "35.0-39.9 without comorbidity)     IUD (intrauterine device) in place - Mirena 3/20/2018, due for replacement 3/20/2023.     Family history of breast cancer in first degree relative - Mother age 51, sister age 40 \"stage zero\" had prophylactic mastectomy, maternal and paternal grandmothers.     Cervical high risk HPV (human papillomavirus) test positive     Anxiety     Indication for care in labor or delivery       Medication Prior to Admission  Medications Prior to Admission   Medication Sig Dispense Refill Last Dose     aspirin (ASA) 81 MG chewable tablet Take 81 mg by mouth daily   2021 at 0800     LANsoprazole (PREVACID) 15 MG DR capsule Take 15 mg by mouth daily   2021 at 0800     NIFEdipine ER (ADALAT CC) 30 MG 24 hr tablet Take 30 mg by mouth every morning   2021 at 2200     Prenatal Vit-Fe Fumarate-FA (PRENATAL MULTIVITAMIN W/IRON) 27-0.8 MG tablet Take 1 tablet by mouth daily   2021 at 2200     sertraline (ZOLOFT) 100 MG tablet Take 1 tablet (100 mg) by mouth daily 90 tablet 1 2021 at 0430   .        Maternal Past Medical History:     Past Medical History:   Diagnosis Date     BMI 30.0-30.9,adult 4/10/2012     Cervical high risk HPV (human papillomavirus) test positive 2019: See problem list.      Depressive disorder     Anxiety     Hypertension      Recurrent UTI 2011                       Family History:   This patient has no significant family history            Social History:   This patient has no significant social history         Review of Systems:   CONSTITUTIONAL: NEGATIVE for fever, chills, change in weight  ENT/MOUTH: NEGATIVE for ear, mouth and throat problems  RESP: NEGATIVE for significant cough or SOB  CV: NEGATIVE for chest pain, palpitations or peripheral edema          Physical Exam:   Vitals were reviewed  All vitals stable  Blood pressure range: Systolic (24hrs), Av , Min:120 , Max:147   Blood pressure range: Diastolic (24hrs), Av, " Min:64, Max:103  Constitutional:   awake, alert, cooperative, no apparent distress, and appears stated age   Eyes:   Lids and lashes normal, pupils equal, round and reactive to light, extra ocular muscles intact, sclera clear, conjunctiva normal   ENT:   Normocephalic, without obvious abnormality, atraumatic, sinuses nontender on palpation, external ears without lesions, oral pharynx with moist mucous membranes, tonsils without erythema or exudates, gums normal and good dentition.   Neck:   Supple, symmetrical, trachea midline, no adenopathy, thyroid symmetric, not enlarged and no tenderness, skin normal   Hematologic / Lymphatic:   no cervical lymphadenopathy   Back:   Symmetric, no curvature, spinous processes are non-tender on palpation, paraspinous muscles are non-tender on palpation, no costal vertebral tenderness   Lungs:   No increased work of breathing, good air exchange, clear to auscultation bilaterally, no crackles or wheezing   Cardiovascular:   Normal apical impulse, regular rate and rhythm, normal S1 and S2, no S3 or S4, and no murmur noted   Abdomen:   No scars, normal bowel sounds, soft, non-distended, non-tender, no masses palpated, no hepatosplenomegally   Chest / Breast:   Breasts symmetrical, skin without lesion(s), no nipple retraction or dimpling, no nipple discharge, no masses palpated, no axillary or supraclavicular adenopathy   Genitounirinary:   Cervix:  2/30/-3 per RN exam on presentation  Gross SROM clear fluid   Musculoskeletal:   There is no redness, warmth, or swelling of the joints.  Full range of motion noted.  Motor strength is 5 out of 5 all extremities bilaterally.  Tone is normal.   Neurologic:   Awake, alert, oriented to name, place and time.  Cranial nerves II-XII are grossly intact.  Motor is 5 out of 5 bilaterally.  Cerebellar finger to nose, heel to shin intact.  Sensory is intact.  Babinski down going, Romberg negative, and gait is normal.   Neuropsychiatric:   General:  normal, calm and normal eye contact  Affect: normal   Skin:   no bruising or bleeding      Cervix:   Membranes: SROM  clear amniotic fluid   Dilation: 2   Effacement: 30%   Station:-3   Consistency: firm   Position: Posterior  Presentation:Cephalic  Fetal Heart Rate Tracing: reactive and reassuring, Tier 1 (normal)  Tocometer: external monitor and frequency q 3-5 minutes                       Assessment:   Jacy Zhang is a 38w0d pregnant female admitted with labor management and SROM.          Plan:   Admit - see IP orders  MD consultant on call Barrie/ available prn  Anticipate     Samantha Sood MD, MD

## 2021-06-27 NOTE — PLAN OF CARE
Pt and  admitted and transferred to room 212. Pt and  agree with POC. Report given Mahi CRUZ RN assuming cares.

## 2021-06-27 NOTE — PROVIDER NOTIFICATION
06/27/21 1500   Provider Notification   Provider Name/Title Dr Sood   Method of Notification In Department   Notification Reason Status Update;SVE

## 2021-06-27 NOTE — PROVIDER NOTIFICATION
06/27/21 0930   Provider Notification   Provider Name/Title Dr Sood   Method of Notification Phone   Notification Reason Membrane Status;Labor Status;Uterine Activity;Pain;Status Update;SVE   New orders received to start pitocin. Pt agrees.

## 2021-06-27 NOTE — PLAN OF CARE
Pt consented to collection of nasopharyngeal swab for Covid-19 testing. Swab collected, double bagged and sent to lab via tube system.

## 2021-06-27 NOTE — PLAN OF CARE
Pt arrived to MAC at 0512 via wheelchair with  Cornelius to assess rupture of membrane. Pt agrees to monitors and assessment. States her water broke at 0330, clear fluid.

## 2021-06-27 NOTE — ANESTHESIA PREPROCEDURE EVALUATION
Anesthesia Pre-Procedure Evaluation    Patient: Jacy Zhang   MRN: [id number] : 1980        Preoperative Diagnosis: * No pre-op diagnosis entered *   Procedure : * No procedures listed *     Past Medical History:   Diagnosis Date     BMI 30.0-30.9,adult 4/10/2012     Cervical high risk HPV (human papillomavirus) test positive 2019: See problem list.      Depressive disorder     Anxiety     Hypertension      Recurrent UTI 2011      Past Surgical History:   Procedure Laterality Date     HC TOOTH EXTRACTION W/FORCEP        No Known Allergies   Social History     Tobacco Use     Smoking status: Never Smoker     Smokeless tobacco: Never Used   Substance Use Topics     Alcohol use: Not Currently      Wt Readings from Last 1 Encounters:   21 127 kg (280 lb)        Anesthesia Evaluation            ROS/MED HX  ENT/Pulmonary:  - neg pulmonary ROS  (-) asthma   Neurologic:  - neg neurologic ROS     Cardiovascular: Comment: Pregnancy induced HTN, started on nifedipine     PVC's - normal echo 2021    (+) hypertension--PIH and BP Meds ---    METS/Exercise Tolerance:     Hematologic:  - neg hematologic  ROS   (+) no anticoagulation therapy, no coagulopathy,     Musculoskeletal:       GI/Hepatic:     (+) GERD,     Renal/Genitourinary:       Endo:     (+) Obesity,     Psychiatric/Substance Use:     (+) psychiatric history anxiety and depression     Infectious Disease:       Malignancy:       Other:            Physical Exam    Airway        Mallampati: III   TM distance: > 3 FB   Neck ROM: full   Mouth opening: > 3 cm    Respiratory Devices and Support         Dental  no notable dental history         Cardiovascular   cardiovascular exam normal          Pulmonary   pulmonary exam normal                OUTSIDE LABS:  CBC:   Lab Results   Component Value Date    WBC 13.3 (H) 2021    WBC 8.9 2011    HGB 12.5 2021    HGB 13.5 2011    HCT 38.3 2021    HCT 40.0  01/06/2011     06/27/2021     01/06/2011     BMP:   Lab Results   Component Value Date     01/06/2011    POTASSIUM 4.0 01/06/2011    CHLORIDE 104 01/06/2011    CO2 27 01/06/2011    BUN 8 01/06/2011    CR 0.66 06/27/2021    CR 0.69 01/06/2011    GLC 88 04/10/2012    GLC 86 01/10/2011     COAGS: No results found for: PTT, INR, FIBR  POC:   Lab Results   Component Value Date    HCG  01/06/2011     Negative   This test provides a presumptive diagnosis of pregnancy or non-pregnancy. A   confirmed pregnancy diagnosis should only be made by a physician after all   clinical and laboratory findings have been evaluated.     HEPATIC:   Lab Results   Component Value Date    ALBUMIN 4.4 01/06/2011    PROTTOTAL 7.1 01/06/2011    ALT 12 06/27/2021    AST 10 06/27/2021    ALKPHOS 94 01/06/2011    BILITOTAL 0.3 01/06/2011     OTHER:   Lab Results   Component Value Date    RODGER 9.3 01/06/2011    TSH 1.44 11/19/2009       Anesthesia Plan    ASA Status:  2      Anesthesia Type: Epidural.              Consents    Anesthesia Plan(s) and associated risks, benefits, and realistic alternatives discussed. Questions answered and patient/representative(s) expressed understanding.     - Discussed with:  Patient         Postoperative Care            Comments:    Orders to manage the epidural infusion have been entered, and through coordination with the nurse, we will continute to manage and monitor the patient's labor epidural.  We will continuously be available to adjust as needed thruout the entire L&D process.        neg OB ROS.       Devon Mak MD

## 2021-06-27 NOTE — PROVIDER NOTIFICATION
06/27/21 0800   Provider Notification   Provider Name/Title Dr Sood   Method of Notification Phone   Notification Reason Membrane Status;Labor Status;Uterine Activity;Pain;Status Update;SVE

## 2021-06-27 NOTE — L&D DELIVERY NOTE
OB Vaginal Delivery Note    Jacy Zhang MRN# [id number]   Age: 40 year old YOB: 1980       GA: 38w0d  GP:   Labor Complications: None   EBL: 100  mL  Delivery QBL: 100 mL  Delivery Type: Vaginal, Spontaneous   ROM to Delivery Time: (Delivered) Hours: 12 Minutes: 6   Weight: 3.81 kg (8 lb 6.4 oz)    1 Minute 5 Minute 10 Minute   Apgar Totals: 7   9             Delivery Details:  Jacy Zhang, a 40 year old  female delivered a viable infant with apgars of 7  and 9 . Patient was fully dilated and pushing after   hours   minutes in active labor. Delivery was via vaginal, spontaneous  to a sterile field under epidural  anesthesia. Infant delivered in vertex    occiput  anterior  position. Anterior and posterior shoulders delivered without difficulty. The cord was clamped, cut twice and 3 vessels  were noted. Cord blood was obtained in routine fashion with the following disposition: lab .      Cord complications: none   Placenta delivered at 2021  3:39 PM . Placental disposition was Hospital disposal . Fundal massage performed and fundus found to be firm.     Episiotomy: none    Perineum, vagina, cervix were inspected, and the following lacerations were noted:   Perineal lacerations: 1st                Any lacerations were repaired in the usual fashion using 4.0 vicryl interrupted suture.    Excellent hemostasis was noted. Needle count correct. Infant and patient in delivery room in good and stable condition.        Semaj ZhangShavonJacy [[id number]]    Labor Event Times    Labor onset date: 21 Onset time: 11:30 AM   Dilation complete date: 21 Complete time:  3:15 PM   Start pushing date/time: 2021 1515      Labor Events     labor?: No   steroids: None  Labor Type: Augmentation  Predominate monitoring during 1st stage: continuous electronic fetal monitoring     Antibiotics received during labor?: No     Rupture date/time: 21 0330   Rupture  "type: Spontaneous rupture of membranes occuring during spontaneous labor or augmentation  Fluid color: Clear  Fluid odor: Normal     Augmentation: Oxytocin  Indications for augmentation: Ineffective Contraction Pattern  1:1 continuous labor support provided by?: RN       Delivery/Placenta Date and Time    Delivery Date: 21 Delivery Time:  3:36 PM   Placenta Date/Time: 2021  3:39 PM  Oxytocin given at the time of delivery: after delivery of placenta  Delivering clinician: Samantha Sood MD          Vaginal Counts     Initial count performed by 2 team members:  Two Team Members   Dr Barrie Truong RN       Needles Suture Needles Sponges (RETIRED) Instruments   Initial counts 2 0 5    Added to count  1     Relief counts       Final counts 2 1 5          Placed during labor Accounted for at the end of labor   FSE Yes Yes   IUPC Yes Yes   Cervadil No NA              Final count performed by 2 team members:  Two Team Members   Dr Barrie Truong RN      Final count correct?: Yes     Apgars    Living status: Living   1 Minute 5 Minute 10 Minute 15 Minute 20 Minute   Skin color: 0  1       Heart rate: 2  2       Reflex irritability: 2  2       Muscle tone: 1  2       Respiratory effort: 2  2       Total: 7  9       Apgars assigned by: KYLE TRUONG RN     Cord    Vessels: 3 Vessels    Cord Complications: None               Cord Blood Disposition: Lab    Gases Sent?: No    Delayed cord clamping?: Yes    Cord Clamping Delay (seconds): 31-60 seconds    Stem cell collection?: No        Resuscitation    Methods: None  Output in Delivery Room: Stool      Measurements    Weight: 8 lb 6.4 oz Length: 1' 7.69\"   Head circumference: 33.5 cm    Output in delivery room: Stool     Skin to Skin and Feeding Plan    Skin to skin initiation date/time: 1841    Skin to skin with: Mother  Skin to skin end date/time:        Labor Events and Shoulder Dystocia    Fetal Tracing Prior to Delivery: Category 1  Shoulder dystocia " present?: Neg     Delivery (Maternal) (Provider to Complete) ([id number])    Episiotomy: None  Perineal lacerations: 1st    Est. blood loss (mL): 100  Repair suture: 4-0 Vicryl  Genital tract inspection done: Pos     Blood Loss  Mother: Jacy Zhang #[id number]   Start of Mother's Information    IO Blood Loss  06/27/21 1130 - 06/27/21 1559    EBL (mL) Hospital Encounter 100 mL    Delivery QBL (mL) Hospital Encounter 100 mL    Total  200 mL         End of Mother's Information  Mother: Jacy Zhang P #[id number]          Delivery - Provider to Complete ([id number])    Delivering clinician: Samantha Sood MD  Attempted Delivery Types (Choose all that apply): Spontaneous Vaginal Delivery  Delivery Type (Choose the 1 that will go to the Birth History): Vaginal, Spontaneous                                 Placenta    Date/Time: 6/27/2021  3:39 PM  Removal: Spontaneous  Disposition: Hospital disposal           Anesthesia    Method: Epidural  Cervical dilation at placement: 4-7                Presentation and Position    Presentation: Vertex     Occiput Anterior                 Samantha Sood MD, MD

## 2021-06-28 PROCEDURE — 120N000012 HC R&B POSTPARTUM

## 2021-06-28 PROCEDURE — 250N000013 HC RX MED GY IP 250 OP 250 PS 637: Performed by: OBSTETRICS & GYNECOLOGY

## 2021-06-28 RX ADMIN — IBUPROFEN 800 MG: 400 TABLET ORAL at 16:08

## 2021-06-28 RX ADMIN — ACETAMINOPHEN 650 MG: 325 TABLET, FILM COATED ORAL at 16:08

## 2021-06-28 RX ADMIN — SENNOSIDES AND DOCUSATE SODIUM 1 TABLET: 8.6; 5 TABLET ORAL at 08:09

## 2021-06-28 RX ADMIN — ACETAMINOPHEN 650 MG: 325 TABLET, FILM COATED ORAL at 21:48

## 2021-06-28 RX ADMIN — ACETAMINOPHEN 650 MG: 325 TABLET, FILM COATED ORAL at 10:34

## 2021-06-28 RX ADMIN — SENNOSIDES AND DOCUSATE SODIUM 1 TABLET: 8.6; 5 TABLET ORAL at 21:47

## 2021-06-28 RX ADMIN — NIFEDIPINE 30 MG: 30 TABLET, FILM COATED, EXTENDED RELEASE ORAL at 21:47

## 2021-06-28 RX ADMIN — SERTRALINE HYDROCHLORIDE 100 MG: 100 TABLET ORAL at 08:09

## 2021-06-28 RX ADMIN — IBUPROFEN 800 MG: 400 TABLET ORAL at 04:14

## 2021-06-28 RX ADMIN — IBUPROFEN 800 MG: 400 TABLET ORAL at 21:47

## 2021-06-28 RX ADMIN — ACETAMINOPHEN 650 MG: 325 TABLET, FILM COATED ORAL at 04:14

## 2021-06-28 RX ADMIN — IBUPROFEN 800 MG: 400 TABLET ORAL at 10:34

## 2021-06-28 NOTE — PLAN OF CARE
The pt arrived to the unit at 1750, initial teaching and safety measures were reviewed with the pt and her spouse.  VSS, pain well-controlled, and the pt voided x1.  She's working on breastfeeding, she required a full staff assist for correct positioning, and latch verified.  On demand/cluster feeding was reviewed; will continue to assist

## 2021-06-28 NOTE — PLAN OF CARE
Fundus firm and bleeding wnl-using ice and tucks.  VSS taking daily procardia.  Rates pain 2-3/10 and taking tylenol and ibuprofen every 6 hrs with good relief.  Using hot packs for cramping.  Voiding without difficulty.  Independent with baby cares.  Encouraged to call with questions or concerns.  Will continue to monitor.

## 2021-06-28 NOTE — PLAN OF CARE
Vital signs stable. Postpartum assessment WDL. Pain controlled with tylenol and ibuprofen. Patient voiding without difficulty. Breastfeeding is going ok. Infant little sleepy at the breasts per Patient. Patient and infant bonding well. Will continue with current plan of care.

## 2021-06-28 NOTE — ANESTHESIA POSTPROCEDURE EVALUATION
Patient: Jacy Zhang    * No procedures listed *    Diagnosis:* No pre-op diagnosis entered *  Diagnosis Additional Information: No value filed.    Anesthesia Type:  Epidural    Note:  Disposition: Inpatient   Postop Pain Control: Uneventful            Sign Out: Well controlled pain   PONV: No   Neuro/Psych: Uneventful            Sign Out: Acceptable/Baseline neuro status   Airway/Respiratory: Uneventful            Sign Out: Acceptable/Baseline resp. status   CV/Hemodynamics: Uneventful            Sign Out: Acceptable CV status; No obvious hypovolemia; No obvious fluid overload   Other NRE: NONE   DID A NON-ROUTINE EVENT OCCUR? No    Event details/Postop Comments:  Pt doing well.  Denies epidural-related complaints.           Last vitals:  Vitals:    06/28/21 1602 06/28/21 1608 06/28/21 1700   BP: 136/81 126/74    Pulse: 85 68    Resp: 18 16 16   Temp: 36.8  C (98.2  F) 37.1  C (98.7  F)    SpO2:          Last vitals prior to Anesthesia Care Transfer:      Electronically Signed By: Devon Mak MD  June 28, 2021  5:38 PM

## 2021-06-28 NOTE — LACTATION NOTE
Initial visit with TORY Louie and baby.  Baby latching on well to the right breast in football hold, nutrtive suckling pattern noted.  Swallows heard.    Towel roll placed behind baby and mother's forearm.     Continues to nurse well per mom. No further questions at this time.   Will follow as needed.   Edna Morocho BSN, RN, PHN, RNC-MNN, IBCLC

## 2021-06-28 NOTE — LACTATION NOTE
Initial visit with TORY Louie and baby.  Baby on the right breast.  Sleepy,  Came off, nipple is round and elongated.    Breastfeeding general information reviewed.   Advised to breastfeed exclusively, on demand, avoid pacifiers, bottles and formula unless medically indicated.  Encouraged rooming in, skin to skin, feeding on demand 8-12x/day or sooner if baby cues.  Explained benefits of holding and skin to skin.  Encouraged lots of skin to skin. Instructed on hand expression. Getting ready for discharge.  Plan: Watch for feeding cues and feed every 2-3 hours and/or on demand. Continue to use feeding log to track intake and appropriate voids and stools. Take feeding log to first follow up appointment or weight check. Encourage skin to skin to promote frequent feedings, thermoregulation and bonding. Follow-up with healthcare provider or lactation consultant for questions or concerns. Discussed breast pumps, Spectra and Medela, will let RN which one she would like to take home.      Instructed on signs/symptoms of engorgement/ plugged ducts and mastitis.  Instructed on comfort measures and when to call MD.  Outpatient resources reviewed, will follow up with [facility].    Continues to nurse well per mom. No further questions at this time.   Will follow as needed.   Edna Morocho BSN, RN, PHN, RNC-MNN, IBCLC

## 2021-06-28 NOTE — PROGRESS NOTES
PPD 1    Resting comfortably  Doing well    Normotensive since delivery    Rh positive  Rubella immune    A/p    Stable PPD 1   yesterday (delivery time 153)    Chronic hypertensive; continuing with nifedipine 30 ER daily  Normotensive since delivery    Routine care  Anticipate discharge home tomorrow    Disc importance of close BP surveillance over the next several days / few weeks    Yan LEOS

## 2021-06-29 VITALS
WEIGHT: 280 LBS | DIASTOLIC BLOOD PRESSURE: 73 MMHG | RESPIRATION RATE: 16 BRPM | HEART RATE: 81 BPM | SYSTOLIC BLOOD PRESSURE: 120 MMHG | OXYGEN SATURATION: 97 % | TEMPERATURE: 98.2 F | BODY MASS INDEX: 43.95 KG/M2 | HEIGHT: 67 IN

## 2021-06-29 PROCEDURE — 250N000013 HC RX MED GY IP 250 OP 250 PS 637: Performed by: OBSTETRICS & GYNECOLOGY

## 2021-06-29 RX ORDER — NIFEDIPINE 30 MG
30 TABLET, EXTENDED RELEASE ORAL DAILY
Qty: 90 TABLET | Refills: 1 | Status: SHIPPED | OUTPATIENT
Start: 2021-06-29

## 2021-06-29 RX ORDER — ACETAMINOPHEN 325 MG/1
650 TABLET ORAL EVERY 4 HOURS PRN
Qty: 90 TABLET | Refills: 0 | Status: SHIPPED | OUTPATIENT
Start: 2021-06-29

## 2021-06-29 RX ORDER — MODIFIED LANOLIN
OINTMENT (GRAM) TOPICAL
Qty: 7 G | Refills: 0 | Status: SHIPPED | OUTPATIENT
Start: 2021-06-29

## 2021-06-29 RX ORDER — IBUPROFEN 800 MG/1
800 TABLET, FILM COATED ORAL EVERY 6 HOURS PRN
Qty: 90 TABLET | Refills: 0 | Status: SHIPPED | OUTPATIENT
Start: 2021-06-29

## 2021-06-29 RX ADMIN — ACETAMINOPHEN 650 MG: 325 TABLET, FILM COATED ORAL at 04:36

## 2021-06-29 RX ADMIN — ACETAMINOPHEN 650 MG: 325 TABLET, FILM COATED ORAL at 10:29

## 2021-06-29 RX ADMIN — IBUPROFEN 800 MG: 400 TABLET ORAL at 04:36

## 2021-06-29 RX ADMIN — SENNOSIDES AND DOCUSATE SODIUM 1 TABLET: 8.6; 5 TABLET ORAL at 07:46

## 2021-06-29 RX ADMIN — IBUPROFEN 800 MG: 400 TABLET ORAL at 10:29

## 2021-06-29 RX ADMIN — SERTRALINE HYDROCHLORIDE 100 MG: 100 TABLET ORAL at 07:46

## 2021-06-29 NOTE — PROGRESS NOTES
"S: Pt doing well. Pt is  feeding. Pain is controlled with current analgesics.  Medication(s) being used: acetaminophen and ibuprofen (OTC).    O: /71 (BP Location: Right arm)   Pulse 85   Temp 98.2  F (36.8  C) (Oral)   Resp 16   Ht 1.702 m (5' 7\")   Wt 127 kg (280 lb)   LMP 10/10/2020   SpO2 97%   Breastfeeding Unknown   BMI 43.85 kg/m          ABD:  Uterine fundus is firm, non-tender and at the level of the umbilicus                Hemoglobin   Date Value Ref Range Status   06/27/2021 12.5 11.7 - 15.7 g/dL Final            Lab Results   Component Value Date    RH Pos 06/27/2021          No results found for: RUBELLATITER    A: Post-partum day #2 s/p Normal spontaneous vaginal delivery    P: Continue PP Cares  History of HTN with normotensive BP since delivery. Plan to continue current Nifedipine. Patient advised to continue checking BP at home daily and to call for any sx of h/a, blurry vision, abdominal pain. She is advised to call for any BP consistently over 150/90.    Discharge Home  Samantha Sood MD, MD on 6/29/2021 at 7:30 AM        "

## 2021-06-29 NOTE — LACTATION NOTE
Follow up Lactation visit with Jacy, significant other Allen & baby boy Jack. Getting ready for discharge. Jacy reports feeding is going ok, but notes baby is very sleepy. She feels her milk is starting to come in but has been struggling to keep baby Jack awake during feeds. Requested LC assist with latching baby to breast and assessing feed.    Jacy latched him to left breast, initial latch was wide with a few swallows heard, but baby quickly fatigued and slipped off the nipple. LC noted baby appeared to be jaundiced so checked bilirubin, noted to be high intermediate risk. Encouraged to change to football hold to keep baby awake, baby able to latch on right breast with LC assist and stimulation to stay awake, where he fed for about 15 minutes with swallows throughout, maintained latch. Needed stimulation throughout feed to stay awake. Reviewed with Allen, father, how he can assist with keeping mary ellen Whiteside awake during feedings. Right breast softened during this good feeding.    Nipple shield given for use with feeding as needed at home, if baby unable to maintain latch without slipping off nipple. Also discussed pumping after feeds if baby does not soften breast or wake easily with audible swallows for feedings, give some EBM via cup or bottle, until baby's follow up appointment. Discussed her Spectra pump settings, as she had a Medela pump with her two other children. Encouraged pumping for 10-15 minutes with any sleepy feeds.    Discussed cluster feeding, what it is and when to expect it, The Second Night, satiety cues, feeding cues, and reviewed Feeding Log for home use.     Reviewed milk supply and engorgement. Reviewed typical timeline of milk supply initiation and progression over first 3-5 days postpartum. Discussed comfort measures for engorgement, plugged duct treatment, and warning signs of breast infection.     Feeding plan: Feed mary ellen Whiteside at least 8 - 12 times every 24 hours. Use nipple shield  as needed for latching. If baby sleepy with feeds at breast, try at breast for up to 15 minutes, then pump for 10-15 minutes and give EBM with cup or bottle. Avoid pacifiers and supplementation with formula unless medically indicated. Encouraged use of feeding log and to record feedings, and void/stool patterns. Jacy has a breast pump for home use. Follow up with [facility], will follow up in clinic with LC. Reviewed outpatient lactation resources. Jacy Villa appreciative of visit.    Meeta Mayorga, RN-C, IBCLC, MNN, PHN, BSN

## 2021-06-29 NOTE — PLAN OF CARE
Pt's VSS.  Fundus firm midline, scant flow. Using ice and tucks.  Tylenol and ibuprofen for pain management.  Breastfeeding well and independently.  Discharge instructions give and discussed.  Safety checks done.  Adequate for discharge home with  and baby.

## 2021-06-29 NOTE — DISCHARGE SUMMARY
[facility]    Discharge Summary  Obstetrics    Date of Admission:  2021  Date of Discharge:  2021  Discharging Provider: Samantha Sood MD, MD  Date of Service (when I saw the patient): 21    Discharge Diagnoses   38 week IUP, delivered vaginal.    History of Present Illness   Jacy Zhang is a 40 year old female who presented with SROM and contractions at term gestation. Pregnancy complicated by AMA, obesity, chronic hypertension controlled on medication.    Hospital Course   The patient's hospital course was unremarkable.  She recovered as anticipated and experienced no post-delivery complications.  On discharge, her pain was well controlled. Vaginal bleeding is similar to peak menstrual flow.  Voiding without difficulty.  Ambulating well and tolerating a normal diet.  No fevers.  Breastfeeding well.  Infant is stable.  She was discharged on post-partum day #2. Patient will continue on current BP meds postpartum.     Post-partum hemoglobin:   Hemoglobin   Date Value Ref Range Status   2021 12.5 11.7 - 15.7 g/dL Final         Discharge Disposition   Discharged to home   Condition at discharge: Good    Primary Care Physician   Becka San    Consultations This Hospital Stay   ANESTHESIOLOGY IP CONSULT  HOME CARE POST PARTUM/ IP CONSULT  LACTATION IP CONSULT    Discharge Orders      Activity    Review discharge instructions     Reason for your hospital stay    Maternity care     Discharge Instructions - Postpartum visit    Schedule postpartum visit with your provider and return to clinic in 8 weeks.     Discharge Instructions - Hypertensive disorders patients    High Blood pressure patients: patient advised to continue current medication and continue checking BP daily. Call office for any symptoms of headache, blurry vision, abdominal pain, lightheadedness. Call for any BP consistently >150/90.     Diet    Resume previous diet     Discharge Medications    Current Discharge Medication List      START taking these medications    Details   acetaminophen (TYLENOL) 325 MG tablet Take 2 tablets (650 mg) by mouth every 4 hours as needed for mild pain or fever (greater than or equal to 38  C /100.4  F (oral) or 38.5  C/ 101.4  F (core).)  Qty: 90 tablet, Refills: 0    Associated Diagnoses: Postpartum care following vaginal delivery; Obstetrical laceration, first degree      ibuprofen (ADVIL/MOTRIN) 800 MG tablet Take 1 tablet (800 mg) by mouth every 6 hours as needed for other (cramping)  Qty: 90 tablet, Refills: 0    Associated Diagnoses: Postpartum care following vaginal delivery; Obstetrical laceration, first degree      lanolin ointment Apply topically every hour as needed for dry skin (sore nipples)  Qty: 7 g, Refills: 0    Associated Diagnoses: Postpartum care following vaginal delivery; Obstetrical laceration, first degree      !! NIFEdipine ER OSMOTIC (ADALAT CC) 30 MG 24 hr tablet Take 1 tablet (30 mg) by mouth daily  Qty: 90 tablet, Refills: 1    Associated Diagnoses: Postpartum care following vaginal delivery; Essential hypertension, benign       !! - Potential duplicate medications found. Please discuss with provider.      CONTINUE these medications which have NOT CHANGED    Details   aspirin (ASA) 81 MG chewable tablet Take 81 mg by mouth daily      LANsoprazole (PREVACID) 15 MG DR capsule Take 15 mg by mouth daily      !! NIFEdipine ER (ADALAT CC) 30 MG 24 hr tablet Take 30 mg by mouth every morning      Prenatal Vit-Fe Fumarate-FA (PRENATAL MULTIVITAMIN W/IRON) 27-0.8 MG tablet Take 1 tablet by mouth daily      sertraline (ZOLOFT) 100 MG tablet Take 1 tablet (100 mg) by mouth daily  Qty: 90 tablet, Refills: 1    Associated Diagnoses: Anxiety       !! - Potential duplicate medications found. Please discuss with provider.        Allergies   No Known Allergies

## 2021-06-29 NOTE — PLAN OF CARE
Vital signs stable. Postpartum assessment WDL. Pain controlled with Tylenol/ Ibuprofen. Blood pressure 131/71, patient taking Procardia daily. Patient denies headache, RUQ pain or vision changes. Patient voiding without difficulty. Breastfeeding on cue with no assist. Patient and infant bonding well. Will continue with current plan of care.

## 2021-09-25 ENCOUNTER — HEALTH MAINTENANCE LETTER (OUTPATIENT)
Age: 41
End: 2021-09-25

## 2021-10-06 ENCOUNTER — OFFICE VISIT (OUTPATIENT)
Dept: FAMILY MEDICINE | Facility: CLINIC | Age: 41
End: 2021-10-06
Payer: COMMERCIAL

## 2021-10-06 VITALS
DIASTOLIC BLOOD PRESSURE: 74 MMHG | BODY MASS INDEX: 43.04 KG/M2 | SYSTOLIC BLOOD PRESSURE: 120 MMHG | HEART RATE: 89 BPM | OXYGEN SATURATION: 98 % | RESPIRATION RATE: 16 BRPM | TEMPERATURE: 98.3 F | WEIGHT: 274.8 LBS

## 2021-10-06 DIAGNOSIS — R35.0 URINARY FREQUENCY: ICD-10-CM

## 2021-10-06 DIAGNOSIS — N89.8 VAGINAL IRRITATION: Primary | ICD-10-CM

## 2021-10-06 LAB
ALBUMIN UR-MCNC: NEGATIVE MG/DL
APPEARANCE UR: CLEAR
BACTERIA #/AREA URNS HPF: ABNORMAL /HPF
BILIRUB UR QL STRIP: NEGATIVE
CLUE CELLS: NORMAL
COLOR UR AUTO: YELLOW
GLUCOSE UR STRIP-MCNC: NEGATIVE MG/DL
HGB UR QL STRIP: ABNORMAL
KETONES UR STRIP-MCNC: NEGATIVE MG/DL
LEUKOCYTE ESTERASE UR QL STRIP: ABNORMAL
NITRATE UR QL: NEGATIVE
PH UR STRIP: 6 [PH] (ref 5–7)
RBC #/AREA URNS AUTO: ABNORMAL /HPF
SP GR UR STRIP: <=1.005 (ref 1–1.03)
SQUAMOUS #/AREA URNS AUTO: ABNORMAL /LPF
TRICHOMONAS, WET PREP: NORMAL
UROBILINOGEN UR STRIP-ACNC: 0.2 E.U./DL
WBC #/AREA URNS AUTO: ABNORMAL /HPF
WBC'S/HIGH POWER FIELD, WET PREP: NORMAL
YEAST, WET PREP: NORMAL

## 2021-10-06 PROCEDURE — 99213 OFFICE O/P EST LOW 20 MIN: CPT | Performed by: INTERNAL MEDICINE

## 2021-10-06 PROCEDURE — 81001 URINALYSIS AUTO W/SCOPE: CPT | Performed by: INTERNAL MEDICINE

## 2021-10-06 PROCEDURE — 87210 SMEAR WET MOUNT SALINE/INK: CPT | Performed by: INTERNAL MEDICINE

## 2021-10-06 ASSESSMENT — PAIN SCALES - GENERAL: PAINLEVEL: NO PAIN (0)

## 2021-10-06 NOTE — PROGRESS NOTES
"    Assessment & Plan     Vaginal irritation  Wet prep is negative for yeast. If symptoms persist, consider repeat fluconazole regardless   - Wet prep - Clinic Collect    Urinary frequency  UA is unremarkable, no abx indicated   - UA with Microscopic reflex to Culture  - Urine Microscopic             BMI:   Estimated body mass index is 43.04 kg/m  as calculated from the following:    Height as of 6/27/21: 1.702 m (5' 7\").    Weight as of this encounter: 124.6 kg (274 lb 12.8 oz).         Return in about 3 months (around 1/6/2022) for Physical Exam.    Raquel Garzon MD  [facility] Louie is a 40 year old who presents for the following health issues     HPI     Vaginal Symptoms  Jacy was treated with an antibiotic recently for mastitis and subsequently developed a yeast infection. She was treated with fluconazole and that mostly resolved the symptoms, but they seem to have returned.  Also experiencing urinary frequency and urgency. She notes she is breastfeeding and drinking a lot of fluids, so hard to tell if this is much out of the realm of normal.     Onset/Duration: 3 days  Description:  Vaginal Discharge: creamy   Itching (Pruritis): YES  Burning sensation:  no  Odor: no  Accompanying Signs & Symptoms:  Urinary symptoms: YES  Abdominal pain: no  Fever: no  History:   Sexually active: YES  New Partner: no  Possibility of Pregnancy:  no  Recent antibiotic use: YES  Previous vaginitis issues: no  Precipitating or alleviating factors: None  Therapies tried and outcome: see above           Review of Systems   Const, gu reviewed,  otherwise negative unless noted above.        Objective    /74 (Cuff Size: Adult Large)   Pulse 89   Temp 98.3  F (36.8  C) (Tympanic)   Resp 16   Wt 124.6 kg (274 lb 12.8 oz)   SpO2 98%   BMI 43.04 kg/m    Body mass index is 43.04 kg/m .  Physical Exam   GENERAL: healthy, alert and no distress  PSYCH: mentation appears normal, affect " normal/bright    Results for orders placed or performed in visit on 10/06/21 (from the past 24 hour(s))   Wet prep - Clinic Collect    Specimen: Vagina; Swab   Result Value Ref Range    Trichomonas Absent Absent    Yeast Absent Absent    Clue Cells Absent Absent    WBCs/high power field None None   UA with Microscopic reflex to Culture    Specimen: Urine, Midstream   Result Value Ref Range    Color Urine Yellow Colorless, Straw, Light Yellow, Yellow    Appearance Urine Clear Clear    Glucose Urine Negative Negative mg/dL    Bilirubin Urine Negative Negative    Ketones Urine Negative Negative mg/dL    Specific Gravity Urine <=1.005 1.003 - 1.035    Blood Urine Trace (A) Negative    pH Urine 6.0 5.0 - 7.0    Protein Albumin Urine Negative Negative mg/dL    Urobilinogen Urine 0.2 0.2, 1.0 E.U./dL    Nitrite Urine Negative Negative    Leukocyte Esterase Urine Trace (A) Negative   Urine Microscopic   Result Value Ref Range    Bacteria Urine Few (A) None Seen /HPF    RBC Urine 0-2 0-2 /HPF /HPF    WBC Urine 0-5 0-5 /HPF /HPF    Squamous Epithelials Urine Few (A) None Seen /LPF    Narrative    Urine Culture not indicated

## 2022-01-15 ENCOUNTER — HEALTH MAINTENANCE LETTER (OUTPATIENT)
Age: 42
End: 2022-01-15

## 2022-12-26 ENCOUNTER — HEALTH MAINTENANCE LETTER (OUTPATIENT)
Age: 42
End: 2022-12-26

## 2023-11-26 ENCOUNTER — HEALTH MAINTENANCE LETTER (OUTPATIENT)
Age: 43
End: 2023-11-26

## 2024-02-04 ENCOUNTER — HEALTH MAINTENANCE LETTER (OUTPATIENT)
Age: 44
End: 2024-02-04

## 2024-04-11 NOTE — TELEPHONE ENCOUNTER
Notified [facility] pharmacy that new RX was sent over       Lenka Moya        Bed/Stretcher in lowest position, wheels locked, appropriate side rails in place/Call bell, personal items and telephone in reach/Instruct patient to call for assistance before getting out of bed/chair/stretcher/Non-slip footwear applied when patient is off stretcher/[place] to call system/Physically safe environment - no spills, clutter or unnecessary equipment/Purposeful proactive rounding/Room/bathroom lighting operational, light cord in reach